# Patient Record
Sex: FEMALE | Race: WHITE | Employment: OTHER | URBAN - METROPOLITAN AREA
[De-identification: names, ages, dates, MRNs, and addresses within clinical notes are randomized per-mention and may not be internally consistent; named-entity substitution may affect disease eponyms.]

---

## 2017-06-02 ENCOUNTER — HOSPITAL ENCOUNTER (OUTPATIENT)
Dept: MAMMOGRAPHY | Age: 62
Discharge: HOME | End: 2017-06-02
Attending: FAMILY MEDICINE | Admitting: FAMILY MEDICINE

## 2017-06-02 DIAGNOSIS — Z12.31 ENCOUNTER FOR SCREENING MAMMOGRAM FOR BREAST CANCER: ICD-10-CM

## 2017-06-15 ENCOUNTER — OFFICE VISIT (OUTPATIENT)
Dept: INTERNAL MEDICINE CLINIC | Age: 62
End: 2017-06-15

## 2017-06-15 VITALS
HEART RATE: 60 BPM | DIASTOLIC BLOOD PRESSURE: 80 MMHG | BODY MASS INDEX: 19.6 KG/M2 | WEIGHT: 110.6 LBS | SYSTOLIC BLOOD PRESSURE: 108 MMHG | HEIGHT: 63 IN

## 2017-06-15 DIAGNOSIS — J30.2 SEASONAL ALLERGIC RHINITIS, UNSPECIFIED ALLERGIC RHINITIS TRIGGER: ICD-10-CM

## 2017-06-15 DIAGNOSIS — Z00.00 ROUTINE PHYSICAL EXAMINATION: Primary | ICD-10-CM

## 2017-06-15 DIAGNOSIS — Z11.59 NEED FOR HEPATITIS C SCREENING TEST: ICD-10-CM

## 2017-06-15 DIAGNOSIS — J45.20 ASTHMA, EXTRINSIC, MILD INTERMITTENT, UNCOMPLICATED: ICD-10-CM

## 2017-06-15 DIAGNOSIS — R23.2 HOT FLASHES: ICD-10-CM

## 2017-06-15 PROCEDURE — 99396 PREV VISIT EST AGE 40-64: CPT | Performed by: FAMILY MEDICINE

## 2017-06-15 RX ORDER — ALBUTEROL SULFATE 90 UG/1
2 AEROSOL, METERED RESPIRATORY (INHALATION) EVERY 4 HOURS PRN
Qty: 3 INHALER | Refills: 1 | Status: SHIPPED | OUTPATIENT
Start: 2017-06-15 | End: 2022-08-30 | Stop reason: SDUPTHER

## 2017-06-15 RX ORDER — ESTRADIOL AND NORETHINDRONE ACETATE 1; .5 MG/1; MG/1
TABLET ORAL
Qty: 84 TABLET | Refills: 3 | Status: SHIPPED | OUTPATIENT
Start: 2017-06-15 | End: 2018-05-18 | Stop reason: SDUPTHER

## 2017-06-15 RX ORDER — FLUTICASONE PROPIONATE 50 MCG
SPRAY, SUSPENSION (ML) NASAL
Qty: 3 BOTTLE | Refills: 3 | Status: SHIPPED | OUTPATIENT
Start: 2017-06-15 | End: 2019-05-15 | Stop reason: SDUPTHER

## 2017-06-15 ASSESSMENT — PATIENT HEALTH QUESTIONNAIRE - PHQ9
1. LITTLE INTEREST OR PLEASURE IN DOING THINGS: 0
SUM OF ALL RESPONSES TO PHQ QUESTIONS 1-9: 0
2. FEELING DOWN, DEPRESSED OR HOPELESS: 0
SUM OF ALL RESPONSES TO PHQ9 QUESTIONS 1 & 2: 0

## 2017-06-21 DIAGNOSIS — R23.2 HOT FLASHES: ICD-10-CM

## 2017-06-21 DIAGNOSIS — Z11.59 NEED FOR HEPATITIS C SCREENING TEST: ICD-10-CM

## 2017-06-21 DIAGNOSIS — Z00.00 ROUTINE PHYSICAL EXAMINATION: ICD-10-CM

## 2017-06-21 LAB
A/G RATIO: 1.8 (ref 1.1–2.2)
ALBUMIN SERPL-MCNC: 4.2 G/DL (ref 3.4–5)
ALP BLD-CCNC: 57 U/L (ref 40–129)
ALT SERPL-CCNC: 18 U/L (ref 10–40)
ANION GAP SERPL CALCULATED.3IONS-SCNC: 14 MMOL/L (ref 3–16)
AST SERPL-CCNC: 29 U/L (ref 15–37)
BASOPHILS ABSOLUTE: 0.1 K/UL (ref 0–0.2)
BASOPHILS RELATIVE PERCENT: 1.9 %
BILIRUB SERPL-MCNC: 0.5 MG/DL (ref 0–1)
BUN BLDV-MCNC: 15 MG/DL (ref 7–20)
CALCIUM SERPL-MCNC: 9.3 MG/DL (ref 8.3–10.6)
CHLORIDE BLD-SCNC: 103 MMOL/L (ref 99–110)
CHOLESTEROL, TOTAL: 222 MG/DL (ref 0–199)
CO2: 25 MMOL/L (ref 21–32)
CREAT SERPL-MCNC: 0.8 MG/DL (ref 0.6–1.2)
EOSINOPHILS ABSOLUTE: 0.2 K/UL (ref 0–0.6)
EOSINOPHILS RELATIVE PERCENT: 5.1 %
GFR AFRICAN AMERICAN: >60
GFR NON-AFRICAN AMERICAN: >60
GLOBULIN: 2.3 G/DL
GLUCOSE BLD-MCNC: 83 MG/DL (ref 70–99)
HCT VFR BLD CALC: 44.2 % (ref 36–48)
HDLC SERPL-MCNC: 86 MG/DL (ref 40–60)
HEMOGLOBIN: 14.4 G/DL (ref 12–16)
HEPATITIS C ANTIBODY INTERPRETATION: NORMAL
LDL CHOLESTEROL CALCULATED: 120 MG/DL
LYMPHOCYTES ABSOLUTE: 1.3 K/UL (ref 1–5.1)
LYMPHOCYTES RELATIVE PERCENT: 34.2 %
MCH RBC QN AUTO: 28.8 PG (ref 26–34)
MCHC RBC AUTO-ENTMCNC: 32.6 G/DL (ref 31–36)
MCV RBC AUTO: 88.4 FL (ref 80–100)
MONOCYTES ABSOLUTE: 0.4 K/UL (ref 0–1.3)
MONOCYTES RELATIVE PERCENT: 9.6 %
NEUTROPHILS ABSOLUTE: 1.8 K/UL (ref 1.7–7.7)
NEUTROPHILS RELATIVE PERCENT: 49.2 %
PDW BLD-RTO: 16.1 % (ref 12.4–15.4)
PLATELET # BLD: 207 K/UL (ref 135–450)
PMV BLD AUTO: 9.1 FL (ref 5–10.5)
POTASSIUM SERPL-SCNC: 4 MMOL/L (ref 3.5–5.1)
RBC # BLD: 5 M/UL (ref 4–5.2)
SODIUM BLD-SCNC: 142 MMOL/L (ref 136–145)
TOTAL PROTEIN: 6.5 G/DL (ref 6.4–8.2)
TRIGL SERPL-MCNC: 81 MG/DL (ref 0–150)
TSH REFLEX: 2.26 UIU/ML (ref 0.27–4.2)
VITAMIN D 25-HYDROXY: 55.7 NG/ML
VLDLC SERPL CALC-MCNC: 16 MG/DL
WBC # BLD: 3.7 K/UL (ref 4–11)

## 2018-05-21 ENCOUNTER — TELEPHONE (OUTPATIENT)
Dept: INTERNAL MEDICINE CLINIC | Age: 63
End: 2018-05-21

## 2018-07-02 ENCOUNTER — OFFICE VISIT (OUTPATIENT)
Dept: INTERNAL MEDICINE CLINIC | Age: 63
End: 2018-07-02

## 2018-07-02 VITALS
DIASTOLIC BLOOD PRESSURE: 80 MMHG | BODY MASS INDEX: 19.49 KG/M2 | WEIGHT: 110 LBS | HEIGHT: 63 IN | SYSTOLIC BLOOD PRESSURE: 100 MMHG | HEART RATE: 60 BPM

## 2018-07-02 DIAGNOSIS — Z12.31 SCREENING MAMMOGRAM, ENCOUNTER FOR: ICD-10-CM

## 2018-07-02 DIAGNOSIS — Z23 NEED FOR ZOSTER VACCINATION: ICD-10-CM

## 2018-07-02 DIAGNOSIS — Z00.00 ROUTINE PHYSICAL EXAMINATION: Primary | ICD-10-CM

## 2018-07-02 DIAGNOSIS — N95.1 MENOPAUSAL SYMPTOM: ICD-10-CM

## 2018-07-02 PROCEDURE — 99396 PREV VISIT EST AGE 40-64: CPT | Performed by: FAMILY MEDICINE

## 2018-07-02 RX ORDER — ESTRADIOL AND NORETHINDRONE ACETATE 1; .5 MG/1; MG/1
TABLET ORAL
Qty: 84 TABLET | Refills: 3 | Status: SHIPPED | OUTPATIENT
Start: 2018-07-02 | End: 2019-08-30 | Stop reason: SDUPTHER

## 2018-07-02 ASSESSMENT — PATIENT HEALTH QUESTIONNAIRE - PHQ9
SUM OF ALL RESPONSES TO PHQ QUESTIONS 1-9: 0
SUM OF ALL RESPONSES TO PHQ9 QUESTIONS 1 & 2: 0
1. LITTLE INTEREST OR PLEASURE IN DOING THINGS: 0
2. FEELING DOWN, DEPRESSED OR HOPELESS: 0

## 2018-07-02 NOTE — PATIENT INSTRUCTIONS
SHINGLES VACCINE = SHINGRIX  1 out of 3 people will get shingles in their lifetime. If you had shingles already once in your life you have a 5% chance of getting it a second time = 5/100 chance of getting it again    The new Shingles vaccine = Shingrix is 95% effective at preventing shingles. The old vaccine Zostavax was 50% effective. The vaccine may not be covered by your insurance, but is on Medicare Part D = will be a prescription benefit and co-pays can vary widely;  out of pocket price without insurance is around $150 per dose and requires 2 doses. Show them your insurance and prescription card; The pharmacy can tell you if it is covered, and how much money you will be responsible for. It is up to you to decide if you want this vaccine.       xxxxxxxxxxxxxxxxxxxxxxxxxxxxxxxxxxxxxxx

## 2018-07-02 NOTE — PROGRESS NOTES
Subjective:      Patient ID: Florecita Lake is a 60 y.o. female. HPI   Chief Complaint   Patient presents with    Medication Refill       Annual preventative exam and med refill    Her oldest getting  Mem[facility] Day weekend    She is down to 1/2 pill per day of the [address]. Mother's cancer did come back. She is on a new drug for her genetic cancer. They live in [facility].  lost his mother and his brother and under a lot of stress. Some stress so thinks some of the night sweats and hot flashes are stress and not menopause. Patient Active Problem List   Diagnosis    Allergic rhinitis    Asthma, extrinsic    Tubular adenoma of colon         Outpatient Prescriptions Marked as Taking for the 7/2/18 encounter (Office Visit) with Feliz Canela MD   Medication Sig Dispense Refill    estradiol-norethindrone (ACTIVELLA) 1-0.5 MG per tablet TAKE 1 TABLET DAILY 84 tablet 0    fluticasone (FLONASE) 50 MCG/ACT nasal spray USE 2 SPRAYS NASALLY DAILY 3 Bottle 3    Fexofenadine HCl (ALLERGY 24-HR PO) Take by mouth      GuaiFENesin (MUCINEX PO) Take by mouth         Social History   Substance Use Topics    Smoking status: Former Smoker     Packs/day: 0.30     Years: 4.00     Types: Cigarettes     Quit date: 1/1/1988    Smokeless tobacco: Never Used    Alcohol use 0.0 - 0.5 oz/week       No Known Allergies    Past Medical History:   Diagnosis Date    Allergic rhinitis     Asthma, extrinsic     mild intermittent    Foot fracture 2009    Menopausal or female climacteric states 7/08    Tubular adenoma of colon 2011    [facility] at [facility]       Past Surgical History:   Procedure Laterality Date    [address]       Review of Systems   Constitutional: Positive for diaphoresis (and hot flashes). All other systems reviewed and are negative. Objective:   Physical Exam   Constitutional: She is oriented to person, place, and time.  She appears

## 2018-07-12 ENCOUNTER — HOSPITAL ENCOUNTER (OUTPATIENT)
Dept: MAMMOGRAPHY | Age: 63
Discharge: HOME | End: 2018-07-12
Attending: FAMILY MEDICINE | Admitting: FAMILY MEDICINE

## 2018-07-12 DIAGNOSIS — Z12.31 SCREENING MAMMOGRAM, ENCOUNTER FOR: ICD-10-CM

## 2019-07-25 ENCOUNTER — HOSPITAL ENCOUNTER (OUTPATIENT)
Dept: WOMENS IMAGING | Age: 64
Discharge: HOME | End: 2019-07-25
Payer: COMMERCIAL

## 2019-07-25 DIAGNOSIS — Z12.31 ENCOUNTER FOR SCREENING MAMMOGRAM FOR BREAST CANCER: ICD-10-CM

## 2019-07-25 PROCEDURE — 77063 BREAST TOMOSYNTHESIS BI: CPT

## 2019-08-30 ENCOUNTER — OFFICE VISIT (OUTPATIENT)
Dept: INTERNAL MEDICINE CLINIC | Age: 64
End: 2019-08-30
Payer: COMMERCIAL

## 2019-08-30 VITALS
DIASTOLIC BLOOD PRESSURE: 72 MMHG | HEIGHT: 63 IN | WEIGHT: 113 LBS | SYSTOLIC BLOOD PRESSURE: 122 MMHG | BODY MASS INDEX: 20.02 KG/M2 | HEART RATE: 60 BPM

## 2019-08-30 DIAGNOSIS — R23.2 HOT FLASHES: ICD-10-CM

## 2019-08-30 DIAGNOSIS — Z12.4 SCREENING FOR CERVICAL CANCER: ICD-10-CM

## 2019-08-30 DIAGNOSIS — N95.1 MENOPAUSAL SYMPTOM: ICD-10-CM

## 2019-08-30 DIAGNOSIS — Z01.419 WOMEN'S ANNUAL ROUTINE GYNECOLOGICAL EXAMINATION: Primary | ICD-10-CM

## 2019-08-30 DIAGNOSIS — Z00.00 PREVENTATIVE HEALTH CARE: ICD-10-CM

## 2019-08-30 DIAGNOSIS — Z78.0 POSTMENOPAUSAL: ICD-10-CM

## 2019-08-30 DIAGNOSIS — Z23 NEED FOR TDAP VACCINATION: ICD-10-CM

## 2019-08-30 PROCEDURE — 90471 IMMUNIZATION ADMIN: CPT | Performed by: FAMILY MEDICINE

## 2019-08-30 PROCEDURE — 90715 TDAP VACCINE 7 YRS/> IM: CPT | Performed by: FAMILY MEDICINE

## 2019-08-30 PROCEDURE — 99396 PREV VISIT EST AGE 40-64: CPT | Performed by: FAMILY MEDICINE

## 2019-08-30 RX ORDER — ESTRADIOL AND NORETHINDRONE ACETATE 1; .5 MG/1; MG/1
TABLET ORAL
Qty: 28 TABLET | Refills: 1 | Status: SHIPPED | OUTPATIENT
Start: 2019-08-30 | End: 2020-11-19 | Stop reason: ALTCHOICE

## 2019-08-30 ASSESSMENT — PATIENT HEALTH QUESTIONNAIRE - PHQ9
SUM OF ALL RESPONSES TO PHQ QUESTIONS 1-9: 0
1. LITTLE INTEREST OR PLEASURE IN DOING THINGS: 0
2. FEELING DOWN, DEPRESSED OR HOPELESS: 0
SUM OF ALL RESPONSES TO PHQ9 QUESTIONS 1 & 2: 0
SUM OF ALL RESPONSES TO PHQ QUESTIONS 1-9: 0

## 2019-08-30 NOTE — PROGRESS NOTES
Chief Complaint   Patient presents with    Gynecologic Exam         ROUTINE GYNECOLOGIC EXAM and PREVENTATIVE EXAM     Gynecologic History  No LMP recorded. Patient is postmenopausal.  Contraception: (if applicable) none  Last Pap:     Sexually active:  Yes      Exercise:  Runs 6 days per week. 30-60 minutes. Very regimented. Patient Active Problem List   Diagnosis    Allergic rhinitis    Asthma, extrinsic    Tubular adenoma of colon         Past Medical History:   Diagnosis Date    Allergic rhinitis     Asthma, extrinsic     mild intermittent    Foot fracture     Menopausal or female climacteric states     Tubular adenoma of colon     [facility] at [facility]     Past Surgical History:   Procedure Laterality Date     SECTION      TUBAL LIGATION       Family History   Problem Relation Age of Onset    Cancer Mother         MGUS vs Mulitple myeloma    Lung Cancer Mother 80        metastatic     Coronary Art Dis Father         Age 74s    Elevated Lipids Father     Other Father         thoracic aortic aneurysm    Prostate Cancer Maternal Grandfather     Lung Cancer Paternal Grandfather          Social History     Tobacco Use    Smoking status: Former Smoker     Packs/day: 0.30     Years: 4.00     Pack years: 1.20     Types: Cigarettes     Last attempt to quit: 1988     Years since quittin.6    Smokeless tobacco: Never Used   Substance Use Topics    Alcohol use: Yes     Alcohol/week: 0.0 - 0.8 standard drinks    Drug use:  No             Outpatient Medications Marked as Taking for the 19 encounter (Office Visit) with Emanuel Martines MD   Medication Sig Dispense Refill    fluticasone (FLONASE) 50 MCG/ACT nasal spray USE 2 SPRAYS NASALLY DAILY 3 Bottle 3    estradiol-norethindrone (ACTIVELLA) 1-0.5 MG per tablet  (Patient taking differently: Take 0.5 tablets by mouth daily --- actually twice a week) 84 tablet 3    Fexofenadine HCl (ALLERGY 24-HR PO) signature was used to authenticate this note.     --Ai Robertson MD on 3:42 PM , 8/30/2019        '

## 2019-09-04 LAB
HPV COMMENT: NORMAL
HPV TYPE 16: NOT DETECTED
HPV TYPE 18: NOT DETECTED
HPVOH (OTHER TYPES): NOT DETECTED

## 2019-09-14 ENCOUNTER — HOSPITAL ENCOUNTER (OUTPATIENT)
Age: 64
Discharge: HOME | End: 2019-09-14
Payer: COMMERCIAL

## 2019-09-14 DIAGNOSIS — R23.2 HOT FLASHES: ICD-10-CM

## 2019-09-14 DIAGNOSIS — Z00.00 PREVENTATIVE HEALTH CARE: ICD-10-CM

## 2019-09-14 LAB
A/G RATIO: 1.9 (ref 1.1–2.2)
ALBUMIN SERPL-MCNC: 4.2 G/DL (ref 3.4–5)
ALP BLD-CCNC: 96 U/L (ref 40–129)
ALT SERPL-CCNC: 38 U/L (ref 10–40)
ANION GAP SERPL CALCULATED.3IONS-SCNC: 13 MMOL/L (ref 3–16)
AST SERPL-CCNC: 44 U/L (ref 15–37)
BASOPHILS ABSOLUTE: 0.1 K/UL (ref 0–0.2)
BASOPHILS RELATIVE PERCENT: 2.7 %
BILIRUB SERPL-MCNC: 0.4 MG/DL (ref 0–1)
BUN BLDV-MCNC: 12 MG/DL (ref 7–20)
CALCIUM SERPL-MCNC: 9.2 MG/DL (ref 8.3–10.6)
CHLORIDE BLD-SCNC: 104 MMOL/L (ref 99–110)
CHOLESTEROL, TOTAL: 198 MG/DL (ref 0–199)
CO2: 24 MMOL/L (ref 21–32)
CREAT SERPL-MCNC: 0.6 MG/DL (ref 0.6–1.2)
EOSINOPHILS ABSOLUTE: 0.1 K/UL (ref 0–0.6)
EOSINOPHILS RELATIVE PERCENT: 3.5 %
GFR AFRICAN AMERICAN: >60
GFR NON-AFRICAN AMERICAN: >60
GLOBULIN: 2.2 G/DL
GLUCOSE BLD-MCNC: 93 MG/DL (ref 70–99)
HCT VFR BLD CALC: 42.6 % (ref 36–48)
HDLC SERPL-MCNC: 79 MG/DL (ref 40–60)
HEMOGLOBIN: 14.5 G/DL (ref 12–16)
LDL CHOLESTEROL CALCULATED: 102 MG/DL
LYMPHOCYTES ABSOLUTE: 1.1 K/UL (ref 1–5.1)
LYMPHOCYTES RELATIVE PERCENT: 35.6 %
MCH RBC QN AUTO: 29.8 PG (ref 26–34)
MCHC RBC AUTO-ENTMCNC: 34 G/DL (ref 31–36)
MCV RBC AUTO: 87.8 FL (ref 80–100)
MONOCYTES ABSOLUTE: 0.4 K/UL (ref 0–1.3)
MONOCYTES RELATIVE PERCENT: 12.3 %
NEUTROPHILS ABSOLUTE: 1.4 K/UL (ref 1.7–7.7)
NEUTROPHILS RELATIVE PERCENT: 45.9 %
PDW BLD-RTO: 14.4 % (ref 12.4–15.4)
PLATELET # BLD: 210 K/UL (ref 135–450)
PMV BLD AUTO: 9.2 FL (ref 5–10.5)
POTASSIUM SERPL-SCNC: 4.3 MMOL/L (ref 3.5–5.1)
RBC # BLD: 4.85 M/UL (ref 4–5.2)
SODIUM BLD-SCNC: 141 MMOL/L (ref 136–145)
TOTAL PROTEIN: 6.4 G/DL (ref 6.4–8.2)
TRIGL SERPL-MCNC: 84 MG/DL (ref 0–150)
TSH REFLEX: 2.45 UIU/ML (ref 0.27–4.2)
VLDLC SERPL CALC-MCNC: 17 MG/DL
WBC # BLD: 3 K/UL (ref 4–11)

## 2019-09-14 PROCEDURE — 36415 COLL VENOUS BLD VENIPUNCTURE: CPT

## 2019-09-14 PROCEDURE — 80061 LIPID PANEL: CPT

## 2019-09-14 PROCEDURE — 80053 COMPREHEN METABOLIC PANEL: CPT

## 2019-09-14 PROCEDURE — 85025 COMPLETE CBC W/AUTO DIFF WBC: CPT

## 2019-09-14 PROCEDURE — 84443 ASSAY THYROID STIM HORMONE: CPT

## 2019-09-15 DIAGNOSIS — D70.9 NEUTROPENIA, UNSPECIFIED TYPE (HCC): Primary | ICD-10-CM

## 2019-11-01 ENCOUNTER — E-VISIT (OUTPATIENT)
Dept: INTERNAL MEDICINE CLINIC | Age: 64
End: 2019-11-01
Payer: COMMERCIAL

## 2019-11-01 PROCEDURE — 99444 PR PHYSICIAN ONLINE EVALUATION & MANAGEMENT SERVICE: CPT | Performed by: FAMILY MEDICINE

## 2019-11-01 RX ORDER — PSEUDOEPHEDRINE HCL 60 MG/1
60 TABLET ORAL EVERY 6 HOURS PRN
Qty: 30 TABLET | Refills: 0 | Status: SHIPPED | OUTPATIENT
Start: 2019-11-01 | End: 2019-11-11

## 2019-11-01 RX ORDER — AMOXICILLIN AND CLAVULANATE POTASSIUM 875; 125 MG/1; MG/1
TABLET, FILM COATED ORAL
Qty: 20 TABLET | Refills: 0 | Status: SHIPPED | OUTPATIENT
Start: 2019-11-01 | End: 2019-11-11

## 2019-11-01 ASSESSMENT — LIFESTYLE VARIABLES
SMOKING_STATUS: NO, I'M A FORMER SMOKER
SMOKING_YEARS: 6
PACKS_PER_DAY: 0

## 2019-12-14 ENCOUNTER — PATIENT MESSAGE (OUTPATIENT)
Dept: INTERNAL MEDICINE CLINIC | Age: 64
End: 2019-12-14

## 2019-12-15 RX ORDER — PREDNISONE 20 MG/1
TABLET ORAL
Qty: 10 TABLET | Refills: 0 | Status: SHIPPED | OUTPATIENT
Start: 2019-12-18 | End: 2019-12-22

## 2019-12-15 RX ORDER — CEFDINIR 300 MG/1
300 CAPSULE ORAL 2 TIMES DAILY
Qty: 20 CAPSULE | Refills: 0 | Status: SHIPPED | OUTPATIENT
Start: 2019-12-15 | End: 2019-12-25

## 2020-04-10 ENCOUNTER — E-VISIT (OUTPATIENT)
Dept: INTERNAL MEDICINE CLINIC | Age: 65
End: 2020-04-10
Payer: MEDICARE

## 2020-04-10 PROBLEM — H00.021 HORDEOLUM INTERNUM OF RIGHT UPPER EYELID: Status: ACTIVE | Noted: 2020-04-10

## 2020-04-10 PROCEDURE — 99421 OL DIG E/M SVC 5-10 MIN: CPT | Performed by: FAMILY MEDICINE

## 2020-04-10 RX ORDER — POLYMYXIN B SULFATE AND TRIMETHOPRIM 1; 10000 MG/ML; [USP'U]/ML
1 SOLUTION OPHTHALMIC
Qty: 1 BOTTLE | Refills: 0 | Status: SHIPPED | OUTPATIENT
Start: 2020-04-10 | End: 2022-02-24 | Stop reason: SDUPTHER

## 2020-04-10 NOTE — PROGRESS NOTES
share towels, pillows, or washcloths while you have a stye. 5-10 minutes were spent on digital evaluation and management.

## 2020-08-17 ENCOUNTER — OFFICE VISIT (OUTPATIENT)
Dept: PRIMARY CARE CLINIC | Age: 65
End: 2020-08-17
Payer: MEDICARE

## 2020-08-17 PROCEDURE — 99211 OFF/OP EST MAY X REQ PHY/QHP: CPT | Performed by: NURSE PRACTITIONER

## 2020-08-18 LAB — SARS-COV-2, NAA: NOT DETECTED

## 2020-09-01 ENCOUNTER — HOSPITAL ENCOUNTER (OUTPATIENT)
Dept: WOMENS IMAGING | Age: 65
Discharge: HOME | End: 2020-09-01
Payer: MEDICARE

## 2020-09-01 PROCEDURE — 77063 BREAST TOMOSYNTHESIS BI: CPT

## 2020-11-19 ENCOUNTER — OFFICE VISIT (OUTPATIENT)
Dept: INTERNAL MEDICINE CLINIC | Age: 65
End: 2020-11-19
Payer: MEDICARE

## 2020-11-19 VITALS
TEMPERATURE: 97.6 F | DIASTOLIC BLOOD PRESSURE: 70 MMHG | HEART RATE: 60 BPM | BODY MASS INDEX: 19.84 KG/M2 | WEIGHT: 112 LBS | HEIGHT: 63 IN | SYSTOLIC BLOOD PRESSURE: 110 MMHG

## 2020-11-19 PROBLEM — H00.021 HORDEOLUM INTERNUM OF RIGHT UPPER EYELID: Status: RESOLVED | Noted: 2020-04-10 | Resolved: 2020-11-19

## 2020-11-19 LAB
A/G RATIO: 2 (ref 1.1–2.2)
ALBUMIN SERPL-MCNC: 4.3 G/DL (ref 3.4–5)
ALP BLD-CCNC: 100 U/L (ref 40–129)
ALT SERPL-CCNC: 26 U/L (ref 10–40)
ANION GAP SERPL CALCULATED.3IONS-SCNC: 10 MMOL/L (ref 3–16)
AST SERPL-CCNC: 36 U/L (ref 15–37)
BILIRUB SERPL-MCNC: 0.4 MG/DL (ref 0–1)
BUN BLDV-MCNC: 21 MG/DL (ref 7–20)
C-REACTIVE PROTEIN: <0.3 MG/L (ref 0–5.1)
CALCIUM SERPL-MCNC: 9.5 MG/DL (ref 8.3–10.6)
CHLORIDE BLD-SCNC: 100 MMOL/L (ref 99–110)
CO2: 28 MMOL/L (ref 21–32)
CREAT SERPL-MCNC: 0.6 MG/DL (ref 0.6–1.2)
GFR AFRICAN AMERICAN: >60
GFR NON-AFRICAN AMERICAN: >60
GLOBULIN: 2.1 G/DL
GLUCOSE BLD-MCNC: 104 MG/DL (ref 70–99)
HCT VFR BLD CALC: 43 % (ref 36–48)
HEMOGLOBIN: 14.3 G/DL (ref 12–16)
MCH RBC QN AUTO: 29.1 PG (ref 26–34)
MCHC RBC AUTO-ENTMCNC: 33.3 G/DL (ref 31–36)
MCV RBC AUTO: 87.4 FL (ref 80–100)
PDW BLD-RTO: 14.4 % (ref 12.4–15.4)
PLATELET # BLD: 240 K/UL (ref 135–450)
PMV BLD AUTO: 9.2 FL (ref 5–10.5)
POTASSIUM SERPL-SCNC: 4.3 MMOL/L (ref 3.5–5.1)
RBC # BLD: 4.92 M/UL (ref 4–5.2)
RHEUMATOID FACTOR: <10 IU/ML
SODIUM BLD-SCNC: 138 MMOL/L (ref 136–145)
TOTAL PROTEIN: 6.4 G/DL (ref 6.4–8.2)
TSH REFLEX: 1.93 UIU/ML (ref 0.27–4.2)
WBC # BLD: 3.6 K/UL (ref 4–11)

## 2020-11-19 PROCEDURE — 99214 OFFICE O/P EST MOD 30 MIN: CPT | Performed by: FAMILY MEDICINE

## 2020-11-19 PROCEDURE — 90732 PPSV23 VACC 2 YRS+ SUBQ/IM: CPT | Performed by: FAMILY MEDICINE

## 2020-11-19 PROCEDURE — G0009 ADMIN PNEUMOCOCCAL VACCINE: HCPCS | Performed by: FAMILY MEDICINE

## 2020-11-19 SDOH — ECONOMIC STABILITY: TRANSPORTATION INSECURITY
IN THE PAST 12 MONTHS, HAS THE LACK OF TRANSPORTATION KEPT YOU FROM MEDICAL APPOINTMENTS OR FROM GETTING MEDICATIONS?: NO

## 2020-11-19 SDOH — ECONOMIC STABILITY: FOOD INSECURITY: WITHIN THE PAST 12 MONTHS, YOU WORRIED THAT YOUR FOOD WOULD RUN OUT BEFORE YOU GOT MONEY TO BUY MORE.: NEVER TRUE

## 2020-11-19 SDOH — ECONOMIC STABILITY: TRANSPORTATION INSECURITY
IN THE PAST 12 MONTHS, HAS LACK OF TRANSPORTATION KEPT YOU FROM MEETINGS, WORK, OR FROM GETTING THINGS NEEDED FOR DAILY LIVING?: NO

## 2020-11-19 SDOH — ECONOMIC STABILITY: FOOD INSECURITY: WITHIN THE PAST 12 MONTHS, THE FOOD YOU BOUGHT JUST DIDN'T LAST AND YOU DIDN'T HAVE MONEY TO GET MORE.: NEVER TRUE

## 2020-11-19 SDOH — ECONOMIC STABILITY: INCOME INSECURITY: HOW HARD IS IT FOR YOU TO PAY FOR THE VERY BASICS LIKE FOOD, HOUSING, MEDICAL CARE, AND HEATING?: NOT HARD AT ALL

## 2020-11-19 ASSESSMENT — PATIENT HEALTH QUESTIONNAIRE - PHQ9
SUM OF ALL RESPONSES TO PHQ QUESTIONS 1-9: 2
SUM OF ALL RESPONSES TO PHQ9 QUESTIONS 1 & 2: 2
2. FEELING DOWN, DEPRESSED OR HOPELESS: 1
SUM OF ALL RESPONSES TO PHQ QUESTIONS 1-9: 2
1. LITTLE INTEREST OR PLEASURE IN DOING THINGS: 1
SUM OF ALL RESPONSES TO PHQ QUESTIONS 1-9: 2

## 2020-11-19 NOTE — PROGRESS NOTES
Subjective:      Patient ID: Nayeli Hidalgo is a 71 y.o. female. HPI   Chief Complaint   Patient presents with    Check-Up     Allergies. Knuckles are swelling and red. Painful       Has not been in for a year so wants to go over her medications and issues. Allergies are really bad. Using otc nasal spray. She always has mucous. On Flonase right now and helps some symptoms but not others. Red and inflamed DIPs and not MCPs. Some of the PIPs are swollen but not red. Painful with cold weather and this time of year. Mother has the same sort of arthritis but not diagnosed as RA. Asthma has been doing fine. Not symptomatic at this time. Does admit to some fatigue but thinks it is weather and pandemic related. 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 Will probably retire for good soon. No Known Allergies      See Assessment for more detail on conditions addressed today. Patient Active Problem List   Diagnosis    Allergic rhinitis    Asthma, extrinsic    Tubular adenoma of colon    Hordeolum internum of right upper eyelid         Outpatient Medications Marked as Taking for the 20 encounter (Office Visit) with [address], MD   Medication Sig Dispense Refill    fluticasone (FLONASE) 50 MCG/ACT nasal spray USE 2 SPRAYS NASALLY DAILY 3 Bottle 3    Fexofenadine HCl (ALLERGY 24-HR PO) Take by mouth      GuaiFENesin (MUCINEX PO) Take by mouth         Social History     Tobacco Use    Smoking status: Former Smoker     Packs/day: 0.30     Years: 4.00     Pack years: 1.20     Types: Cigarettes     Last attempt to quit: 1988     Years since quittin.9    Smokeless tobacco: Never Used   Substance Use Topics    Alcohol use: Yes     Alcohol/week: 0.0 - 0.8 standard drinks    Drug use: No         Review of Systems   HENT: Negative for sore throat. Respiratory: Negative for cough, chest tightness, shortness of breath and wheezing. Cardiovascular: Negative for chest pain, palpitations and leg swelling. Skin: Negative for rash and wound. Neurological: Negative for dizziness, syncope, facial asymmetry, speech difficulty, weakness, numbness and headaches. Objective:   Physical Exam  Vitals signs reviewed. Constitutional:       General: She is not in acute distress. Appearance: Normal appearance. She is well-developed. She is not diaphoretic. HENT:      Head: Normocephalic and atraumatic. Right Ear: Hearing, tympanic membrane, ear canal and external ear normal.      Left Ear: Hearing, tympanic membrane, ear canal and external ear normal.      Nose: Nose normal. No mucosal edema, congestion or rhinorrhea. Comments: Red irritated nasal mucosa  Eyes:      General: No scleral icterus. Conjunctiva/sclera: Conjunctivae normal.      Pupils: Pupils are equal, round, and reactive to light. Neck:      Musculoskeletal: Normal range of motion and neck supple. Thyroid: No thyroid mass or thyromegaly. Vascular: No carotid bruit. Cardiovascular:      Rate and Rhythm: Normal rate and regular rhythm. Heart sounds: Normal heart sounds, S1 normal and S2 normal. No murmur. Pulmonary:      Effort: Pulmonary effort is normal. No respiratory distress. Breath sounds: Normal breath sounds. No decreased breath sounds, wheezing, rhonchi or rales. Abdominal:      General: Bowel sounds are normal. There is no abdominal bruit. Palpations: Abdomen is soft. There is no hepatomegaly or mass. Tenderness: There is no abdominal tenderness. Musculoskeletal: Normal range of motion. General: No tenderness. Lymphadenopathy:      Cervical: No cervical adenopathy. Skin:     General: Skin is warm and dry. Coloration: Skin is not pale. Nails: There is no clubbing. Neurological:      Mental Status: She is alert and oriented to person, place, and time.       Cranial Nerves: No cranial nerve deficit. Motor: No tremor or abnormal muscle tone. Coordination: Coordination normal.      Gait: Gait normal.      Deep Tendon Reflexes: Reflexes are normal and symmetric. Psychiatric:         Speech: Speech normal.         Behavior: Behavior normal.       .  Wt Readings from Last 3 Encounters:   11/19/20 112 lb (50.8 kg)   08/30/19 113 lb (51.3 kg)   07/02/18 110 lb (49.9 kg)       The 10-year ASCVD risk score (Lupe Wheeler, et al., 2013) is: 3.6%    Values used to calculate the score:      Age: 71 years      Sex: Female      Is Non- : No      Diabetic: No      Tobacco smoker: No      Systolic Blood Pressure: 459 mmHg      Is BP treated: No      HDL Cholesterol: 79 mg/dL      Total Cholesterol: 198 mg/dL      Assessment:      1. Mild intermittent extrinsic asthma without complication  No need for inhaler in over a year. Inactive. 2. Seasonal allergic rhinitis, unspecified trigger  Change to less irritation nasal steroid. Prn Zyrtec and prn Sudafed. Keep nasal membranes moisturized. - budesonide (RHINOCORT AQUA) 32 MCG/ACT nasal spray; 1 spray by Each Nostril route 2 times daily  Dispense: 120 Bottle; Refill: 11    3. Postmenopausal  - DEXA BONE DENSITY AXIAL SKELETON; Future    4. 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 Consider NSAID daily. - Comprehensive Metabolic Panel  - Cyclic Citrul Peptide Antibody, IgG  - C-Reactive Protein  - Sedimentation Rate  - Rheumatoid Factor  - MICHELLE Reflex to Antibody Cascade    5. Fatigue, unspecified type  - Comprehensive Metabolic Panel  - CBC  - TSH with Reflex    6. Need for pneumococcal vaccination  - PNEUMOVAX 23 subcutaneous/IM (Pneumococcal polysaccharide vaccine 23-valent >= 1yo)            Plan:      See orders. See after visit summary, patient instructions, and reference hand-outs.   A PRINTED SUMMARY = AVS GIVEN TO THE PATIENT, (or if Virtual Visit, patient told it is available in My Chart or will be mailed if not active on My Chart.)    Discussed use, benefit, and side effects of prescribed medications. Barriers to medication compliance addressed. All patient questions answered.           Tara Heller MD

## 2020-11-20 LAB
ANTI-NUCLEAR ANTIBODY (ANA): NEGATIVE
CYCLIC CITRULLINATED PEPTIDE ANTIBODY IGG: 0.7 U/ML (ref 0–2.9)
SEDIMENTATION RATE, ERYTHROCYTE: 7 MM/HR (ref 0–30)

## 2020-11-22 ASSESSMENT — ENCOUNTER SYMPTOMS
SORE THROAT: 0
COUGH: 0
SHORTNESS OF BREATH: 0
WHEEZING: 0
CHEST TIGHTNESS: 0

## 2021-02-09 ENCOUNTER — VIRTUAL VISIT (OUTPATIENT)
Dept: INTERNAL MEDICINE CLINIC | Age: 66
End: 2021-02-09
Payer: MEDICARE

## 2021-02-09 DIAGNOSIS — Z00.00 ROUTINE GENERAL MEDICAL EXAMINATION AT A HEALTH CARE FACILITY: Primary | ICD-10-CM

## 2021-02-09 DIAGNOSIS — Z12.11 COLON CANCER SCREENING: ICD-10-CM

## 2021-02-09 DIAGNOSIS — Z71.89 ACP (ADVANCE CARE PLANNING): ICD-10-CM

## 2021-02-09 PROCEDURE — G0438 PPPS, INITIAL VISIT: HCPCS | Performed by: FAMILY MEDICINE

## 2021-02-09 ASSESSMENT — PATIENT HEALTH QUESTIONNAIRE - PHQ9
1. LITTLE INTEREST OR PLEASURE IN DOING THINGS: 0
2. FEELING DOWN, DEPRESSED OR HOPELESS: 0
SUM OF ALL RESPONSES TO PHQ QUESTIONS 1-9: 0
SUM OF ALL RESPONSES TO PHQ QUESTIONS 1-9: 0

## 2021-02-09 ASSESSMENT — LIFESTYLE VARIABLES
HOW MANY STANDARD DRINKS CONTAINING ALCOHOL DO YOU HAVE ON A TYPICAL DAY: 0
HAVE YOU OR SOMEONE ELSE BEEN INJURED AS A RESULT OF YOUR DRINKING: 0
HOW OFTEN DURING THE LAST YEAR HAVE YOU HAD A FEELING OF GUILT OR REMORSE AFTER DRINKING: 0
HAS A RELATIVE, FRIEND, DOCTOR, OR ANOTHER HEALTH PROFESSIONAL EXPRESSED CONCERN ABOUT YOUR DRINKING OR SUGGESTED YOU CUT DOWN: 0
HOW OFTEN DURING THE LAST YEAR HAVE YOU FAILED TO DO WHAT WAS NORMALLY EXPECTED FROM YOU BECAUSE OF DRINKING: 0

## 2021-02-09 NOTE — PROGRESS NOTES
Medicare Annual Wellness Visit  Name: Demetria Condon Date: 2021   MRN: [id number] Sex: Female   Age: 76 y.o. Ethnicity: Non-/Non    : 1955 Race: Karyna Tamayo is here for Medicare AWV    Screenings for behavioral, psychosocial and functional/safety risks, and cognitive dysfunction are all negative except as indicated below. These results, as well as other patient data from the [address] form, are documented in Flowsheets linked to this Encounter. No Known Allergies    Prior to Visit Medications    Medication Sig Taking?  Authorizing Provider   budesonide (RHINOCORT AQUA) 32 MCG/ACT nasal spray 1 spray by Each Nostril route 2 times daily Yes Valentin Mendez MD   Fexofenadine HCl (ALLERGY 24-HR PO) Take by mouth Yes Historical Provider, MD   albuterol sulfate  (90 Base) MCG/ACT inhaler Inhale 2 puffs into the lungs every 4 hours as needed for Wheezing or Shortness of Breath  Patient not taking: Reported on 2020  Valentin Mendez MD   GuaiFENesin ([address] PO) Take by mouth  Historical Provider, MD       Past Medical History:   Diagnosis Date    Allergic rhinitis     Asthma, extrinsic     mild intermittent    Foot fracture 2009    Menopausal or female climacteric states     Tubular adenoma of colon     [address] [place] [facility]       Past Surgical History:   Procedure Laterality Date     [address]       Family History   Problem Relation Age of Onset    Cancer Mother         MGUS vs Mulitple myeloma    Lung Cancer Mother 80        metastatic     Coronary Art Dis Father         Age 76s   Hamida Sevilla Elevated Lipids Father     Other Father         thoracic aortic aneurysm    Prostate Cancer Maternal Grandfather     Lung Cancer Paternal Grandfather        CareTeam (Including outside providers/suppliers regularly involved in providing care):   Patient Care Team:  Valentin Mendez MD as PCP - General (Family Medicine)  Meghann Vazquez MD as PCP - REHABILITATION [facility] Provider    Wt Readings from Last 3 Encounters:   11/19/20 112 lb (50.8 kg)   08/30/19 113 lb (51.3 kg)   07/02/18 110 lb (49.9 kg)     Patient-Reported Vitals 2/9/2021   Patient-Reported Weight 111.7lb   Patient-Reported Height 5f3      There is no height or weight on file to calculate BMI. Based upon direct observation of the patient, evaluation of cognition reveals recent and remote memory intact. She described clock correctly and told me where the hands go to read the time. Physical Exam  Constitutional:       Appearance: Normal appearance. Eyes:      General: No scleral icterus. Pulmonary:      Effort: Pulmonary effort is normal. No respiratory distress. Skin:     Coloration: Skin is not pale. Neurological:      General: No focal deficit present. Mental Status: She is alert and oriented to person, place, and time. Psychiatric:         Mood and Affect: Mood normal.         Behavior: Behavior normal.           Patient's complete Health Risk Assessment and screening values have been reviewed and are found in Flowsheets. The following problems were reviewed today and where indicated follow up appointments were made and/or referrals ordered. Positive Risk Factor Screenings with Interventions:          General Health and ACP:  General  In general, how would you say your health is?: Very Good  In the past 7 days, have you experienced any of the following? New or Increased Pain, New or Increased Fatigue, Loneliness, Social Isolation, Stress or Anger?: None of These  Do you get the social and emotional support that you need?: Yes  Do you have a Living Will?: Yes  Advance Directives     Power of [address] [place] [address] Will ACP-Advance Directive ACP-Power of     Not on File Not on File Not on File Not on File      General Health Risk Interventions:  · asked to get us a copy of her Advance Directives.            Safety:  Safety  Do you have working smoke detectors?: Yes  Have all throw rugs been removed or fastened?: Yes  Do you have non-slip mats or surfaces in all bathtubs/showers?: (!) No  Do all of your stairways have a railing or banister?: Yes  Are your doorways, halls and stairs free of clutter?: Yes  Do you always fasten your seatbelt when you are in a car?: Yes  Safety Interventions:  · Home safety tips provided     Personalized Preventive Plan   Current Health Maintenance Status  Immunization History   Administered Date(s) Administered    DTaP 07/29/2009    Pneumococcal Polysaccharide ([id number]) 11/19/2020    Tdap (Boostrix, Adacel) 08/30/2019        Health Maintenance   Topic Date Due    COVID-19 Vaccine (1 of 2) 12/20/1970    Shingles Vaccine (1 of 2) 12/20/2004    DEXA (modify frequency per FRAX score)  12/20/2009    Annual Wellness Visit (AWV)  04/26/2020    Colon cancer screen colonoscopy  06/17/2021    Breast cancer screen  09/01/2021    Lipid screen  09/14/2024    DTaP/Tdap/Td vaccine (3 - Td) 08/30/2029    Flu vaccine  Completed    Pneumococcal 65+ years Vaccine  Completed    Hepatitis C screen  Completed    Hepatitis A vaccine  Aged Out    Hepatitis B vaccine  Aged Out    Hib vaccine  Aged Out    Meningococcal (ACWY) vaccine  Aged Out     Recommendations for [place] Due: see orders and patient instructions/AVS.  . Recommended screening schedule for the next 5-10 years is provided to the patient in written form: see Patient Instructions/AVS.    Devan Zapata was seen today for medicare awv. Diagnoses and all orders for this visit:    1. Routine general medical examination at a health care facility  Update HM issues  Encouraged her to get Shingrix in Spring or Summer. Had her 1st COVID19 vaccine. Will send me pix of her card after 2nd dose. 2. ACP (advance care planning)  Full code at this time. Has living will. 3. Colon cancer screening  Due in June for 5 year screen.    - [facility] - Barb Brito MD, Gastroenterology, [facility]          Chelsey Zurita is a 76 y.o. female being evaluated by a Virtual Visit (video and audio) encounter to address concerns as mentioned above. A caregiver was present when appropriate. Due to this being a TeleHealth encounter (During FKRZN-58 public health emergency), evaluation of the following organ systems was limited: Vitals/Constitutional/EENT/Resp/CV/GI//MS/Neuro/Skin/Heme-Lymph-Imm. Pursuant to the emergency declaration under the [address] [place] [address], [address] [place] [address] authority and the [employer] Act, this Virtual Visit was conducted with patient's (and/or legal guardian's) consent, to reduce the patient's risk of exposure to COVID-19 and provide necessary medical care. The patient (and/or legal guardian) has also been advised to contact this office for worsening conditions or problems, and seek emergency medical treatment and/or call 911 if deemed necessary. Patient identification was verified at the start of the visit: Yes    Services were provided through a video synchronous discussion virtually to substitute for in-person clinic visit. Patient and provider were located at their individual homes. --Castilol Mcdonnell MD on 2/9/2021 at 10:17 AM    An electronic signature was used to authenticate this note.

## 2021-02-09 NOTE — PATIENT INSTRUCTIONS
Personalized Preventive Plan for Meg Mis - 2/9/2021  Medicare offers a range of preventive health benefits. Some of the tests and screenings are paid in full while other may be subject to a deductible, co-insurance, and/or copay. Some of these benefits include a comprehensive review of your medical history including lifestyle, illnesses that may run in your family, and various assessments and screenings as appropriate. After reviewing your medical record and screening and assessments performed today your provider may have ordered immunizations, labs, imaging, and/or referrals for you. A list of these orders (if applicable) as well as your Preventive Care list are included within your After Visit Summary for your review. Other Preventive Recommendations:    · A preventive eye exam performed by an eye specialist is recommended every 1-2 years to screen for glaucoma; cataracts, macular degeneration, and other eye disorders. · A preventive dental visit is recommended every 6 months. · Try to get at least 150 minutes of exercise per week or 10,000 steps per day on a pedometer . · Order or download the FREE \"Exercise & Physical Activity: Your Everyday Guide\" from [employer]. Call 2-785.126.4134 or search [employer] online. · You need 0593-0861 mg of calcium and 4762-4271 IU of vitamin D per day. It is possible to meet your calcium requirement with diet alone, but a vitamin D supplement is usually necessary to meet this goal.  · When exposed to the sun, use a sunscreen that protects against both UVA and UVB radiation with an SPF of 30 or greater. Reapply every 2 to 3 hours or after sweating, drying off with a towel, or swimming. · Always wear a seat belt when traveling in a car. Always wear a helmet when riding a bicycle or motorcycle.

## 2021-04-24 ENCOUNTER — PATIENT MESSAGE (OUTPATIENT)
Dept: INTERNAL MEDICINE CLINIC | Age: 66
End: 2021-04-24

## 2021-04-24 DIAGNOSIS — J30.2 SEASONAL ALLERGIC RHINITIS, UNSPECIFIED TRIGGER: ICD-10-CM

## 2021-04-26 RX ORDER — MOMETASONE FUROATE 50 UG/1
SPRAY, METERED NASAL
Qty: 3 INHALER | Refills: 2 | Status: SHIPPED | OUTPATIENT
Start: 2021-04-26 | End: 2022-03-03

## 2021-04-26 NOTE — TELEPHONE ENCOUNTER
From: Lorenza Rosario  To: Emma Field MD  Sent: 4/24/2021 6:13 PM EDT  Subject: Prescription Question    Can I have [facility] ?

## 2021-06-01 ENCOUNTER — HOSPITAL ENCOUNTER (OUTPATIENT)
Dept: GENERAL RADIOLOGY | Age: 66
Discharge: HOME | End: 2021-06-01
Payer: MEDICARE

## 2021-06-01 DIAGNOSIS — Z78.0 POSTMENOPAUSAL: ICD-10-CM

## 2021-06-01 PROCEDURE — 77080 DXA BONE DENSITY AXIAL: CPT

## 2021-06-02 PROBLEM — M81.6 LOCALIZED OSTEOPOROSIS WITHOUT CURRENT PATHOLOGICAL FRACTURE: Status: ACTIVE | Noted: 2021-06-02

## 2021-06-29 ENCOUNTER — VIRTUAL VISIT (OUTPATIENT)
Dept: INTERNAL MEDICINE CLINIC | Age: 66
End: 2021-06-29
Payer: MEDICARE

## 2021-06-29 DIAGNOSIS — M81.0 OSTEOPOROSIS WITHOUT CURRENT PATHOLOGICAL FRACTURE, UNSPECIFIED OSTEOPOROSIS TYPE: Primary | ICD-10-CM

## 2021-06-29 PROCEDURE — 99213 OFFICE O/P EST LOW 20 MIN: CPT | Performed by: FAMILY MEDICINE

## 2021-06-29 RX ORDER — ALENDRONATE SODIUM 70 MG/1
TABLET ORAL
Qty: 12 TABLET | Status: SHIPPED
Start: 2021-06-29 | End: 2021-09-28 | Stop reason: SINTOL

## 2021-06-29 NOTE — PATIENT INSTRUCTIONS
Patient Education        Learning About High-Calcium Foods  What foods are high in calcium? The foods you eat contain nutrients, such as vitamins and minerals. Calcium is a nutrient. Your body needs the right amount to stay healthy and work as it should. You can use the list below to help you make choices about which foods to eat. Here are some foods that contain calcium. Fruits  · Figs, dried  · Orange juice, fortified with calcium  Vegetables  · Bok uriel  · Broccoli  · Parker greens  · Kale  Grains  · Cereals, fortified with calcium  Dairy and dairy alternatives  · Cheese  · Milk  · Non-dairy milk (almond, rice, soy), fortified with calcium  · Yogurt  Protein foods  · Almonds  · [place] or sardines, canned with bones  · Soybeans  · Tofu, fortified with calcium  Work with your doctor to find out how much of this nutrient you need. Depending on your health, you may need more or less of it in your diet. Current as of: December 17, 2020               Content Version: 12.9  © 2006-2021 Healthwise[employer]. Care instructions adapted under license by [facility] ([facility]). If you have questions about a medical condition or this instruction, always ask your healthcare professional. [place] [address] any warranty or liability for your use of this information.

## 2021-06-29 NOTE — PROGRESS NOTES
Nohemy Olguin (:  1955) is a 76 y.o. female,Established patient, here for evaluation of the following chief complaint(s): Results (osteoporosis)         ASSESSMENT/PLAN:  1. Osteoporosis without current pathological fracture, unspecified osteoporosis type  Discussed risk of this disorder and medication options. Adequate calcium and Vit D.  - alendronate (FOSAMAX) 70 MG tablet; 1 tab each wk w/8 oz. water only. Remain upright. Do not eat, drink other liquids or take meds for at least 30 min. (Patient not taking: Reported on 2021)  Dispense: 12 tablet; Refill: prn        Follow up prn. SUBJECTIVE/OBJECTIVE:  HPI    FU of DEXA showing osteoporosis in spine and osteopenia in hips. She is feeling fine. Cut back some on running due to hip pain but thinks it is related to soft tissue and not OP. Should be OK on Vitamin D. Spends lots of time outdoors. Not good with calcium in foods. Takes 600 mg per day in supplement. Her father did fracture and had osteoporosis    No back pain. No dental issues or plans for implants or pulled teeth. Outpatient Medications Marked as Taking for the 21 encounter (Virtual Visit) with Ranjith Byrne MD   Medication Sig Dispense Refill    alendronate (FOSAMAX) 70 MG tablet 1 tab each wk w/8 oz. water only. Remain upright. Do not eat, drink other liquids or take meds for at least 30 min. 12 tablet prn    mometasone (NASONEX) 50 MCG/ACT nasal spray 1-2 sprays each nostril daily. Need to use regularly for benefit. 3 Inhaler 2    Fexofenadine HCl (ALLERGY 24-HR PO) Take by mouth      GuaiFENesin (MUCINEX PO) Take by mouth           Review of Systems    Patient-Reported Vitals 2021   Patient-Reported Weight 112lb   Patient-Reported Height 5f3        Physical Exam  Constitutional:       Appearance: Normal appearance. Eyes:      General: No scleral icterus. Pulmonary:      Effort: Pulmonary effort is normal. No respiratory distress. Skin:     Coloration: Skin is not pale. Neurological:      General: No focal deficit present. Mental Status: She is alert and oriented to person, place, and time. Psychiatric:         Mood and Affect: Mood normal.         Behavior: Behavior normal.       Lab Results   Component Value Date     11/19/2020    K 4.3 11/19/2020     11/19/2020    CO2 28 11/19/2020    BUN 21 11/19/2020    CREATININE 0.6 11/19/2020    GLUCOSE 104 11/19/2020    CALCIUM 9.5 11/19/2020      DEXA spine T score minus 3.2. Femoral necks minus 2.0 and minus 2.1       20-25 min      Angela Arguello, was evaluated through a synchronous (real-time) audio-video encounter. The patient (or guardian if applicable) is aware that this is a billable service. Verbal consent to proceed has been obtained within the past 12 months. The visit was conducted pursuant to the emergency declaration under the [address] [place] [address] and the [employer] and [employer] General Act. Patient identification was verified, and a caregiver was present when appropriate. The patient was located in a state where the provider was credentialed to provide care. An electronic signature was used to authenticate this note.     --Daily Ching MD

## 2021-09-07 ENCOUNTER — OFFICE VISIT (OUTPATIENT)
Dept: SURGERY | Age: 66
End: 2021-09-07
Payer: MEDICARE

## 2021-09-07 VITALS — DIASTOLIC BLOOD PRESSURE: 60 MMHG | BODY MASS INDEX: 20.3 KG/M2 | SYSTOLIC BLOOD PRESSURE: 132 MMHG | WEIGHT: 114.6 LBS

## 2021-09-07 DIAGNOSIS — K38.8 HYPERPLASTIC POLYP OF APPENDIX: Primary | ICD-10-CM

## 2021-09-07 PROCEDURE — 99203 OFFICE O/P NEW LOW 30 MIN: CPT | Performed by: SURGERY

## 2021-09-07 ASSESSMENT — ENCOUNTER SYMPTOMS
RESPIRATORY NEGATIVE: 1
ALLERGIC/IMMUNOLOGIC NEGATIVE: 1
GASTROINTESTINAL NEGATIVE: 1
EYES NEGATIVE: 1

## 2021-09-07 NOTE — PROGRESS NOTES
[facility] GENERAL AND LAPAROSCOPIC SURGERY                       PATIENT NAME: Eileen Canavan        TODAY'S DATE: 2021    Reason for Consult:  Polyp    Requesting Physician / PCP:  Dr. Khoa Head / Dr. Christian Wood:              The patient is a 76 y.o. female who presents with a colon lesion. Pt has had a screening colonoscopy with abnormality of the appendix noted. Pt with inverted appearing appending, and path exam concerning for polyp, with extension down into the appendix. Pt without acute GI sx's. No N/V/D. No F/C. Has not had GI bleeding. No prior intestinal surgery. Past Medical History:        Diagnosis Date    Allergic rhinitis     Asthma, extrinsic     mild intermittent    Foot fracture     Menopausal or female climacteric states     Tubular adenoma of colon     [facility] at [facility]       Past Surgical History:        Procedure Laterality Date     SECTION      TUBAL LIGATION         Current Medications:   No current facility-administered medications for this visit. Prior to Admission medications    Medication Sig Start Date End Date Taking? Authorizing Provider   alendronate (FOSAMAX) 70 MG tablet 1 tab each wk w/8 oz. water only. Remain upright. Do not eat, drink other liquids or take meds for at least 30 min. 21  Yes Nicolas Alberts MD   Calcium Carbonate (CALCIUM-CARB 600 PO) Take 1 tablet by mouth daily   Yes Historical Provider, MD   mometasone (NASONEX) 50 MCG/ACT nasal spray 1-2 sprays each nostril daily. Need to use regularly for benefit.  21  Yes Nicolas Alberts MD   albuterol sulfate  (90 Base) MCG/ACT inhaler Inhale 2 puffs into the lungs every 4 hours as needed for Wheezing or Shortness of Breath 6/15/17  Yes Nicolas Alberts MD   Fexofenadine HCl (ALLERGY 24-HR PO) Take by mouth   Yes Historical Provider, MD   GuaiFENesin (MUCINEX PO) Take by mouth   Yes Historical Provider, MD Allergies:  Patient has no known allergies. Social History:    reports that she quit smoking about 33 years ago. Her smoking use included cigarettes. She has a 1.20 pack-year smoking history. She has never used smokeless tobacco. She reports current alcohol use. She reports that she does not use drugs. Family History:        Problem Relation Age of Onset    Cancer Mother         MGUS vs Mulitple myeloma    Lung Cancer Mother 80        metastatic     Coronary Art Dis Father         Age 74s    Elevated Lipids Father     Other Father         thoracic aortic aneurysm    Osteoporosis Father     Prostate Cancer Maternal Grandfather     Lung Cancer Paternal Grandfather        REVIEW OF SYSTEMS:  Review of Systems   Constitutional: Negative. HENT: Negative. Eyes: Negative. Respiratory: Negative. Cardiovascular: Negative. Gastrointestinal: Negative. Endocrine: Negative. Genitourinary: Negative. Musculoskeletal: Negative. Skin: Negative. Allergic/Immunologic: Negative. Neurological: Negative. Hematological: Negative. Psychiatric/Behavioral: Negative.         PHYSICAL EXAM:  VITALS:  /60   Wt 114 lb 9.6 oz (52 kg)   BMI 20.30 kg/m²   CONSTITUTIONAL:  alert, no apparent distress and normal weight  EYES:  sclera clear  ENT:  normocepalic, without obvious abnormality  NECK:  supple, symmetrical, trachea midline  LUNGS:  clear to auscultation  CARDIOVASCULAR:  regular rate and rhythm and no murmur noted  ABDOMEN:  scars noted are healed, normal bowel sounds, soft, non-distended, non-tender, voluntary guarding absent, no masses palpated, no hepatosplenomegally and hernia absent  MUSCULOSKELETAL:  0+ pitting edema lower extremities  NEUROLOGIC:  Mental Status Exam:  Level of Alertness:   awake  Orientation:   person, place, time  SKIN:  no bruising or bleeding, normal skin color, texture, turgor and no redness, warmth, or swelling        Radiology Review: None    IMPRESSION/RECOMMENDATIONS:    Appendiceal polyp, extension into appendix. Will require lap appy to completely remove and eval path fully    The problem and plan were discussed in detail with the patient today. All questions have been answered, and they agree to proceed. Will plan to do Lap appy as outpt at [facility].        Yon Gonzalez MD

## 2021-09-27 ENCOUNTER — PATIENT MESSAGE (OUTPATIENT)
Dept: INTERNAL MEDICINE CLINIC | Age: 66
End: 2021-09-27

## 2021-09-28 NOTE — TELEPHONE ENCOUNTER
From: Johnny Yarbrough  To: Napoleon Jaquez MD  Sent: 9/27/2021 7:50 PM EDT  Subject: Prescription Question    I have been taking Alendronate Sodium 70 mg. for several months now. I take it Saturday mornings with 8oz water and don't eat etc for 30 min. The last few times I have taken it I have noticed that by Sunday afternoon my stomach starts hurting , is bloated and I have a lot of gas . I do take Gas X for it but it tends to be a problem until later Monday or Tuesday . I do tend to have a sensitive stomach but am wondering if the issues Iately are due to this particular drug . Thank you.  Chloe Reyes

## 2021-10-22 ENCOUNTER — HOSPITAL ENCOUNTER (OUTPATIENT)
Dept: WOMENS IMAGING | Age: 66
Discharge: HOME | End: 2021-10-22
Payer: MEDICARE

## 2021-10-22 VITALS — BODY MASS INDEX: 19.84 KG/M2 | HEIGHT: 63 IN | WEIGHT: 112 LBS

## 2021-10-22 DIAGNOSIS — Z12.31 VISIT FOR SCREENING MAMMOGRAM: ICD-10-CM

## 2021-10-22 PROCEDURE — 77063 BREAST TOMOSYNTHESIS BI: CPT

## 2021-12-01 RX ORDER — M-VIT,TX,IRON,MINS/CALC/FOLIC 27MG-0.4MG
1 TABLET ORAL DAILY
COMMUNITY

## 2021-12-01 NOTE — PROGRESS NOTES
Name_______________________________________Printed:____________________  Date and time of surgery___ 12/3 0730_____________________Arrival Time:__0600______________   1. The instructions given regarding when and if a patient needs to stop oral intake prior to surgery varies. Follow the specific instructions you were given                  _x__Nothing to eat or to drink after Midnight the night before.                   ____Carbo loading or ERAS instructions will be given to select patients-if you have been given those instructions -please do the following                           The evening before your surgery after dinner before midnight drink 40 ounces of gatorade. If you are diabetic use sugar free. The morning of surgery drink 40 ounces of water. This needs to be finished 3 hours prior to your surgery start time. 2. Take the following pills with a small sip of water on the morning of surgery___none________________________________________________                  Do not take blood pressure medications ending in pril or sartan the jesse prior to surgery or the morning of surgery_   3. Aspirin, Ibuprofen, Advil, Naproxen, Vitamin E and other Anti-inflammatory products and supplements should be stopped for 5 -7days before surgery or as directed by your physician. 4. Check with your Doctor regarding stopping Plavix, Coumadin,Eliquis, Lovenox,Effient,Pradaxa,Xarelto, Fragmin or other blood thinners and follow their instructions. 5. Do not smoke, and do not drink any alcoholic beverages 24 hours prior to surgery. This includes NA Beer. Refrain from the usage of any recreational drugs. 6. You may brush your teeth and gargle the morning of surgery. DO NOT SWALLOW WATER   7. You MUST make arrangements for a responsible adult to stay on site while you are here and take you home after your surgery. You will not be allowed to leave alone or drive yourself home.   It is strongly suggested someone stay with you the first 24 hrs. Your surgery will be cancelled if you do not have a ride home. 8. A parent/legal guardian must accompany a child scheduled for surgery and plan to stay at the hospital until the child is discharged. Please do not bring other children with you. 9. Please wear simple, loose fitting clothing to the hospital.  [place] not bring valuables (money, credit cards, checkbooks, etc.) Do not wear any makeup (including no eye makeup) or nail polish on your fingers or toes. 10. DO NOT wear any jewelry or piercings on day of surgery. All body piercing jewelry must be removed. 11. If you have ___dentures, they will be removed before going to the OR; we will provide you a container. If you wear ___contact lenses or ___glasses, they will be removed; please bring a case for them. 12. Please see your family doctor/pediatrician for a history & physical and/or concerning medications. Bring any test results/reports from your physician's office. PCP__________________Phone___________H&P Appt. Date________             13 If you  have a Living Will and Durable Power of  for Healthcare, please bring in a copy. 15. Notify your Surgeon if you develop any illness between now and surgery  time, cough, cold, fever, sore throat, nausea, vomiting, etc.  Please notify your surgeon if you experience dizziness, shortness of breath or blurred vision between now & the time of your surgery             15. DO NOT shave your operative site 96 hours prior to surgery. For face & neck surgery, men may use an electric razor 48 hours prior to surgery. 16. Shower the night before or morning of surgery using an antibacterial soap or as you have been instructed. 17. To provide excellent care visitors will be limited to one in the room at any given time. 18.  Please bring picture ID and insurance card.              19.  Visit our web site for additional information:  [email or web address]              20.During flu season no children under the age of 15 are permitted in the hospital for the safety of all patients. 21. If you take a long acting insulin in the evening only  take half of your usual  dose the night  before your procedure              22. If you use a c-pap please bring DOS if staying overnight,             23.For your convenience [facility] has a pharmacy on site to fill your prescriptions. 24. If you use oxygen and have a portable tank please bring it  with you the DOS             25. Bring a complete list of all your medications with name and dose include any supplements. 26. Other__________________________________________   *Please call pre admission testing if you any further questions   Penny Topete   [address] [place] [address]. [facility]  [phone]   [facility]    ___ Covid test to be done 3-5 days prior to scheduled surgery -patient aware they are REQUIRED to bring a copy of the negative result DOS-if they receive a positive result to notify their surgeon         If known - indicate where patient is getting covid test done ___________________________________________________________    ___ [facility] - DOS    _x__ Other____patient to bring neg. results_______________________________      Verdishanna Warner POLICY(subject to change)    There is a one visitor policy at [facility] for all surgeries and endoscopies. Whether the visitor can stay or will be asked to wait in the car will depend on the current policy and if social distancing can be maintained. The policy is subject to change at any time. Please make sure the visitor has a cell phone that is on,charged and able to accept calls, as this may be the way that the staff communicates with them. Pain management is NO VISITOR policyThe patients ride is expected to remain in the car with a cell phone for communication. If the ride is leaving the hospital grounds please make sure they are back in time for pickup. Have the patient inform the staff on arrival what their rides plans are while the patient is in the facility. At the MAIN there is one visitor allowed. Please note that the visitor policy is subject to change. All above information reviewed with patient in person or by phone. Patient verbalizes understanding. All questions and concerns addressed.                                                                                                  Patient/Rep____________________                                                                                                                   Per phone/pt                 PRE OP INSTRUCTIONS

## 2021-12-03 ENCOUNTER — ANESTHESIA (OUTPATIENT)
Dept: OPERATING ROOM | Age: 66
End: 2021-12-03
Payer: MEDICARE

## 2021-12-03 ENCOUNTER — ANESTHESIA EVENT (OUTPATIENT)
Dept: OPERATING ROOM | Age: 66
End: 2021-12-03
Payer: MEDICARE

## 2021-12-03 ENCOUNTER — HOSPITAL ENCOUNTER (OUTPATIENT)
Age: 66
Setting detail: OUTPATIENT SURGERY
Discharge: HOME | End: 2021-12-03
Attending: SURGERY | Admitting: SURGERY
Payer: MEDICARE

## 2021-12-03 VITALS
OXYGEN SATURATION: 99 % | HEIGHT: 63 IN | WEIGHT: 117 LBS | SYSTOLIC BLOOD PRESSURE: 142 MMHG | DIASTOLIC BLOOD PRESSURE: 68 MMHG | RESPIRATION RATE: 15 BRPM | BODY MASS INDEX: 20.73 KG/M2 | HEART RATE: 53 BPM | TEMPERATURE: 97 F

## 2021-12-03 VITALS
DIASTOLIC BLOOD PRESSURE: 57 MMHG | RESPIRATION RATE: 3 BRPM | OXYGEN SATURATION: 100 % | SYSTOLIC BLOOD PRESSURE: 110 MMHG

## 2021-12-03 DIAGNOSIS — K38.8 HYPERPLASTIC POLYP OF APPENDIX: Primary | ICD-10-CM

## 2021-12-03 PROCEDURE — 3700000001 HC ADD 15 MINUTES (ANESTHESIA): Performed by: SURGERY

## 2021-12-03 PROCEDURE — 7100000010 HC PHASE II RECOVERY - FIRST 15 MIN: Performed by: SURGERY

## 2021-12-03 PROCEDURE — 3700000000 HC ANESTHESIA ATTENDED CARE: Performed by: SURGERY

## 2021-12-03 PROCEDURE — 2580000003 HC RX 258: Performed by: SURGERY

## 2021-12-03 PROCEDURE — 6360000002 HC RX W HCPCS: Performed by: NURSE ANESTHETIST, CERTIFIED REGISTERED

## 2021-12-03 PROCEDURE — 44970 LAPAROSCOPY APPENDECTOMY: CPT | Performed by: SURGERY

## 2021-12-03 PROCEDURE — 2500000003 HC RX 250 WO HCPCS: Performed by: NURSE ANESTHETIST, CERTIFIED REGISTERED

## 2021-12-03 PROCEDURE — 7100000001 HC PACU RECOVERY - ADDTL 15 MIN: Performed by: SURGERY

## 2021-12-03 PROCEDURE — 2709999900 HC NON-CHARGEABLE SUPPLY: Performed by: SURGERY

## 2021-12-03 PROCEDURE — 2580000003 HC RX 258: Performed by: NURSE ANESTHETIST, CERTIFIED REGISTERED

## 2021-12-03 PROCEDURE — 3600000014 HC SURGERY LEVEL 4 ADDTL 15MIN: Performed by: SURGERY

## 2021-12-03 PROCEDURE — 88304 TISSUE EXAM BY PATHOLOGIST: CPT

## 2021-12-03 PROCEDURE — 6360000002 HC RX W HCPCS: Performed by: SURGERY

## 2021-12-03 PROCEDURE — 2500000003 HC RX 250 WO HCPCS: Performed by: SURGERY

## 2021-12-03 PROCEDURE — 7100000000 HC PACU RECOVERY - FIRST 15 MIN: Performed by: SURGERY

## 2021-12-03 PROCEDURE — 3600000004 HC SURGERY LEVEL 4 BASE: Performed by: SURGERY

## 2021-12-03 PROCEDURE — 7100000011 HC PHASE II RECOVERY - ADDTL 15 MIN: Performed by: SURGERY

## 2021-12-03 PROCEDURE — 2720000010 HC SURG SUPPLY STERILE: Performed by: SURGERY

## 2021-12-03 RX ORDER — SODIUM CHLORIDE, SODIUM LACTATE, POTASSIUM CHLORIDE, CALCIUM CHLORIDE 600; 310; 30; 20 MG/100ML; MG/100ML; MG/100ML; MG/100ML
INJECTION, SOLUTION INTRAVENOUS CONTINUOUS PRN
Status: DISCONTINUED | OUTPATIENT
Start: 2021-12-03 | End: 2021-12-03 | Stop reason: SDUPTHER

## 2021-12-03 RX ORDER — MAGNESIUM HYDROXIDE 1200 MG/15ML
LIQUID ORAL CONTINUOUS PRN
Status: COMPLETED | OUTPATIENT
Start: 2021-12-03 | End: 2021-12-03

## 2021-12-03 RX ORDER — FENTANYL CITRATE 50 UG/ML
INJECTION, SOLUTION INTRAMUSCULAR; INTRAVENOUS PRN
Status: DISCONTINUED | OUTPATIENT
Start: 2021-12-03 | End: 2021-12-03 | Stop reason: SDUPTHER

## 2021-12-03 RX ORDER — LIDOCAINE HYDROCHLORIDE 20 MG/ML
INJECTION, SOLUTION EPIDURAL; INFILTRATION; INTRACAUDAL; PERINEURAL PRN
Status: DISCONTINUED | OUTPATIENT
Start: 2021-12-03 | End: 2021-12-03 | Stop reason: SDUPTHER

## 2021-12-03 RX ORDER — SODIUM CHLORIDE 0.9 % (FLUSH) 0.9 %
5-40 SYRINGE (ML) INJECTION PRN
Status: DISCONTINUED | OUTPATIENT
Start: 2021-12-03 | End: 2021-12-03 | Stop reason: HOSPADM

## 2021-12-03 RX ORDER — GABAPENTIN 100 MG/1
100 CAPSULE ORAL 3 TIMES DAILY
Status: DISCONTINUED | OUTPATIENT
Start: 2021-12-03 | End: 2021-12-03 | Stop reason: HOSPADM

## 2021-12-03 RX ORDER — ROCURONIUM BROMIDE 10 MG/ML
INJECTION, SOLUTION INTRAVENOUS PRN
Status: DISCONTINUED | OUTPATIENT
Start: 2021-12-03 | End: 2021-12-03 | Stop reason: SDUPTHER

## 2021-12-03 RX ORDER — DEXAMETHASONE 4 MG/1
8 TABLET ORAL ONCE
Status: DISCONTINUED | OUTPATIENT
Start: 2021-12-03 | End: 2021-12-03 | Stop reason: HOSPADM

## 2021-12-03 RX ORDER — ACETAMINOPHEN 325 MG/1
650 TABLET ORAL EVERY 4 HOURS PRN
Status: DISCONTINUED | OUTPATIENT
Start: 2021-12-03 | End: 2021-12-03 | Stop reason: HOSPADM

## 2021-12-03 RX ORDER — TRAMADOL HYDROCHLORIDE 50 MG/1
50 TABLET ORAL ONCE
Status: DISCONTINUED | OUTPATIENT
Start: 2021-12-03 | End: 2021-12-03 | Stop reason: HOSPADM

## 2021-12-03 RX ORDER — SODIUM CHLORIDE, SODIUM LACTATE, POTASSIUM CHLORIDE, CALCIUM CHLORIDE 600; 310; 30; 20 MG/100ML; MG/100ML; MG/100ML; MG/100ML
INJECTION, SOLUTION INTRAVENOUS CONTINUOUS
Status: DISCONTINUED | OUTPATIENT
Start: 2021-12-03 | End: 2021-12-03 | Stop reason: HOSPADM

## 2021-12-03 RX ORDER — PROPOFOL 10 MG/ML
INJECTION, EMULSION INTRAVENOUS PRN
Status: DISCONTINUED | OUTPATIENT
Start: 2021-12-03 | End: 2021-12-03 | Stop reason: SDUPTHER

## 2021-12-03 RX ORDER — HYDRALAZINE HYDROCHLORIDE 20 MG/ML
5 INJECTION INTRAMUSCULAR; INTRAVENOUS EVERY 10 MIN PRN
Status: DISCONTINUED | OUTPATIENT
Start: 2021-12-03 | End: 2021-12-03 | Stop reason: HOSPADM

## 2021-12-03 RX ORDER — PHENYLEPHRINE HCL IN 0.9% NACL 1 MG/10 ML
SYRINGE (ML) INTRAVENOUS PRN
Status: DISCONTINUED | OUTPATIENT
Start: 2021-12-03 | End: 2021-12-03 | Stop reason: SDUPTHER

## 2021-12-03 RX ORDER — LIDOCAINE HYDROCHLORIDE 10 MG/ML
1 INJECTION, SOLUTION EPIDURAL; INFILTRATION; INTRACAUDAL; PERINEURAL
Status: DISCONTINUED | OUTPATIENT
Start: 2021-12-03 | End: 2021-12-03 | Stop reason: HOSPADM

## 2021-12-03 RX ORDER — HALOPERIDOL 5 MG/ML
1 INJECTION INTRAMUSCULAR
Status: DISCONTINUED | OUTPATIENT
Start: 2021-12-03 | End: 2021-12-03 | Stop reason: HOSPADM

## 2021-12-03 RX ORDER — HYDROCODONE BITARTRATE AND ACETAMINOPHEN 5; 325 MG/1; MG/1
1 TABLET ORAL EVERY 4 HOURS PRN
Qty: 15 TABLET | Refills: 0 | Status: SHIPPED | OUTPATIENT
Start: 2021-12-03 | End: 2021-12-10

## 2021-12-03 RX ORDER — DEXAMETHASONE SODIUM PHOSPHATE 4 MG/ML
INJECTION, SOLUTION INTRA-ARTICULAR; INTRALESIONAL; INTRAMUSCULAR; INTRAVENOUS; SOFT TISSUE PRN
Status: DISCONTINUED | OUTPATIENT
Start: 2021-12-03 | End: 2021-12-03 | Stop reason: SDUPTHER

## 2021-12-03 RX ORDER — ONDANSETRON 2 MG/ML
INJECTION INTRAMUSCULAR; INTRAVENOUS PRN
Status: DISCONTINUED | OUTPATIENT
Start: 2021-12-03 | End: 2021-12-03 | Stop reason: SDUPTHER

## 2021-12-03 RX ORDER — BUPIVACAINE HYDROCHLORIDE 5 MG/ML
INJECTION, SOLUTION EPIDURAL; INTRACAUDAL
Status: COMPLETED | OUTPATIENT
Start: 2021-12-03 | End: 2021-12-03

## 2021-12-03 RX ORDER — SODIUM CHLORIDE 9 MG/ML
25 INJECTION, SOLUTION INTRAVENOUS PRN
Status: DISCONTINUED | OUTPATIENT
Start: 2021-12-03 | End: 2021-12-03 | Stop reason: HOSPADM

## 2021-12-03 RX ORDER — SODIUM CHLORIDE 0.9 % (FLUSH) 0.9 %
5-40 SYRINGE (ML) INJECTION EVERY 12 HOURS SCHEDULED
Status: DISCONTINUED | OUTPATIENT
Start: 2021-12-03 | End: 2021-12-03 | Stop reason: HOSPADM

## 2021-12-03 RX ORDER — FENTANYL CITRATE 50 UG/ML
25 INJECTION, SOLUTION INTRAMUSCULAR; INTRAVENOUS EVERY 10 MIN PRN
Status: DISCONTINUED | OUTPATIENT
Start: 2021-12-03 | End: 2021-12-03 | Stop reason: HOSPADM

## 2021-12-03 RX ORDER — CIPROFLOXACIN 2 MG/ML
400 INJECTION, SOLUTION INTRAVENOUS EVERY 12 HOURS
Status: DISCONTINUED | OUTPATIENT
Start: 2021-12-03 | End: 2021-12-03 | Stop reason: HOSPADM

## 2021-12-03 RX ORDER — LABETALOL HYDROCHLORIDE 5 MG/ML
5 INJECTION, SOLUTION INTRAVENOUS EVERY 10 MIN PRN
Status: DISCONTINUED | OUTPATIENT
Start: 2021-12-03 | End: 2021-12-03 | Stop reason: HOSPADM

## 2021-12-03 RX ADMIN — DEXAMETHASONE SODIUM PHOSPHATE 8 MG: 4 INJECTION, SOLUTION INTRAMUSCULAR; INTRAVENOUS at 07:55

## 2021-12-03 RX ADMIN — ROCURONIUM BROMIDE 10 MG: 10 INJECTION, SOLUTION INTRAVENOUS at 07:56

## 2021-12-03 RX ADMIN — SUGAMMADEX 200 MG: 100 INJECTION, SOLUTION INTRAVENOUS at 08:29

## 2021-12-03 RX ADMIN — SODIUM CHLORIDE, POTASSIUM CHLORIDE, SODIUM LACTATE AND CALCIUM CHLORIDE: 600; 310; 30; 20 INJECTION, SOLUTION INTRAVENOUS at 07:30

## 2021-12-03 RX ADMIN — ROCURONIUM BROMIDE 30 MG: 10 INJECTION, SOLUTION INTRAVENOUS at 07:42

## 2021-12-03 RX ADMIN — FENTANYL CITRATE 100 MCG: 50 INJECTION, SOLUTION INTRAMUSCULAR; INTRAVENOUS at 07:41

## 2021-12-03 RX ADMIN — ONDANSETRON 4 MG: 2 INJECTION INTRAMUSCULAR; INTRAVENOUS at 07:55

## 2021-12-03 RX ADMIN — CIPROFLOXACIN 400 MG: 2 INJECTION, SOLUTION INTRAVENOUS at 07:42

## 2021-12-03 RX ADMIN — Medication 200 MCG: at 08:26

## 2021-12-03 RX ADMIN — METRONIDAZOLE 500 MG: 500 INJECTION, SOLUTION INTRAVENOUS at 07:25

## 2021-12-03 RX ADMIN — LIDOCAINE HYDROCHLORIDE 60 MG: 20 INJECTION, SOLUTION EPIDURAL; INFILTRATION; INTRACAUDAL; PERINEURAL at 07:42

## 2021-12-03 RX ADMIN — PROPOFOL 120 MG: 10 INJECTION, EMULSION INTRAVENOUS at 07:42

## 2021-12-03 ASSESSMENT — PAIN SCALES - GENERAL
PAINLEVEL_OUTOF10: 0

## 2021-12-03 ASSESSMENT — PULMONARY FUNCTION TESTS
PIF_VALUE: 20
PIF_VALUE: 18
PIF_VALUE: 13
PIF_VALUE: 19
PIF_VALUE: 22
PIF_VALUE: 13
PIF_VALUE: 14
PIF_VALUE: 23
PIF_VALUE: 13
PIF_VALUE: 14
PIF_VALUE: 13
PIF_VALUE: 12
PIF_VALUE: 13
PIF_VALUE: 0
PIF_VALUE: 13
PIF_VALUE: 20
PIF_VALUE: 13
PIF_VALUE: 16
PIF_VALUE: 5
PIF_VALUE: 13
PIF_VALUE: 20
PIF_VALUE: 20
PIF_VALUE: 13
PIF_VALUE: 19
PIF_VALUE: 24
PIF_VALUE: 13
PIF_VALUE: 12
PIF_VALUE: 13
PIF_VALUE: 20
PIF_VALUE: 2
PIF_VALUE: 13
PIF_VALUE: 20
PIF_VALUE: 17
PIF_VALUE: 13
PIF_VALUE: 20
PIF_VALUE: 18
PIF_VALUE: 13
PIF_VALUE: 16
PIF_VALUE: 18
PIF_VALUE: 0
PIF_VALUE: 14
PIF_VALUE: 20
PIF_VALUE: 12
PIF_VALUE: 20
PIF_VALUE: 13
PIF_VALUE: 12
PIF_VALUE: 19
PIF_VALUE: 13
PIF_VALUE: 20
PIF_VALUE: 14
PIF_VALUE: 13
PIF_VALUE: 12

## 2021-12-03 NOTE — PROGRESS NOTES
Pt handoff received from Peg PACU RN at bedside, pt awake alert and oriented,vss, pt has no complaints at this time.

## 2021-12-03 NOTE — ANESTHESIA POSTPROCEDURE EVALUATION
Department of Anesthesiology  Postprocedure Note    Patient: Angeli Perez  MRN: [id number]  YOB: 1955  Date of evaluation: 12/3/2021  Time:  9:10 AM     Procedure Summary     Date: 12/03/21 Room / Location: [address]    Anesthesia Start: 0730 Anesthesia Stop: 0261    Procedure: LAPAROSCOPIC APPENDECTOMY (N/A Abdomen) Diagnosis: (K38.8  APPENDICEAL POLYP)    Surgeons: Lorenza Henning MD Responsible Provider: Akin Lopez MD    Anesthesia Type: general ASA Status: 2          Anesthesia Type: general    Bethel Phase I: Bethel Score: 5    Bethel Phase II:      Last vitals: Reviewed and per EMR flowsheets.        Anesthesia Post Evaluation    Patient location during evaluation: PACU  Patient participation: complete - patient participated  Pain score: 0  Complications: no  Cardiovascular status: hemodynamically stable  Respiratory status: acceptable  Multimodal analgesia pain management approach

## 2021-12-03 NOTE — ANESTHESIA PRE PROCEDURE
Department of Anesthesiology  Preprocedure Note       Name:  Carlton Dahl   Age:  76 y.o.  :  1955                                          MRN:  [id number]         Date:  12/3/2021      Surgeon: Jacobo Veronica):  Rambo Page MD    Procedure: Procedure(s):  LAPAROSCOPIC APPENDECTOMY    Medications prior to admission:   Prior to Admission medications    Medication Sig Start Date End Date Taking? Authorizing Provider   Multiple Vitamins-Minerals (THERAPEUTIC MULTIVITAMIN-MINERALS) tablet Take 1 tablet by mouth daily   Yes Historical Provider, MD   mometasone (NASONEX) 50 MCG/ACT nasal spray 1-2 sprays each nostril daily. Need to use regularly for benefit. 21  Yes Marilynn Asher MD   Fexofenadine HCl (ALLERGY 24-HR PO) Take by mouth   Yes Historical Provider, MD   GuaiFENesin (MUCINEX PO) Take by mouth   Yes Historical Provider, MD   Calcium Carbonate (CALCIUM-CARB 600 PO) Take 1 tablet by mouth daily    Historical Provider, MD   albuterol sulfate  (90 Base) MCG/ACT inhaler Inhale 2 puffs into the lungs every 4 hours as needed for Wheezing or Shortness of Breath 6/15/17   Marilynn Asher MD       Current medications:    No current facility-administered medications for this encounter. Allergies:     Allergies   Allergen Reactions    Ibuprofen      GI upset       Problem List:    Patient Active Problem List   Diagnosis Code    Allergic rhinitis J30.9    Asthma, extrinsic J45.909    Tubular adenoma of colon D12.6    Osteoporosis M81.6       Past Medical History:        Diagnosis Date    Allergic rhinitis     Asthma, extrinsic     mild intermittent    Foot fracture     Menopausal or female climacteric states     Tubular adenoma of colon     [facility] at [facility]       Past Surgical History:        Procedure Laterality Date     SECTION      COLONOSCOPY      TUBAL LIGATION      WISDOM TOOTH EXTRACTION         Social History:    Social History Tobacco Use    Smoking status: Former Smoker     Packs/day: 0.30     Years: 4.00     Pack years: 1.20     Types: Cigarettes     Quit date: 1988     Years since quittin.9    Smokeless tobacco: Never Used    Tobacco comment: quit around age 28   Substance Use Topics    Alcohol use: Yes     Alcohol/week: 0.0 - 0.8 standard drinks                                Counseling given: Not Answered  Comment: quit around age 28      Vital Signs (Current):   Vitals:    21 1450   Weight: 112 lb (50.8 kg)   Height: 5' 2.5\" (1.588 m)                                              BP Readings from Last 3 Encounters:   21 132/60   20 110/70   19 122/72       NPO Status: Time of last liquid consumption:                         Time of last solid consumption:                         Date of last liquid consumption: 21                        Date of last solid food consumption: 21    BMI:   Wt Readings from Last 3 Encounters:   21 112 lb (50.8 kg)   10/22/21 112 lb (50.8 kg)   21 114 lb 9.6 oz (52 kg)     Body mass index is 20.16 kg/m². CBC:   Lab Results   Component Value Date    WBC 3.6 2020    RBC 4.92 2020    HGB 14.3 2020    HCT 43.0 2020    MCV 87.4 2020    RDW 14.4 2020     2020       CMP:   Lab Results   Component Value Date     2020    K 4.3 2020     2020    CO2 28 2020    BUN 21 2020    CREATININE 0.6 2020    GFRAA >60 2020    GFRAA >60 2012    AGRATIO 2.0 2020    LABGLOM >60 2020    GLUCOSE 104 2020    PROT 6.4 2020    PROT 6.7 2012    CALCIUM 9.5 2020    BILITOT 0.4 2020    ALKPHOS 100 2020    AST 36 2020    ALT 26 2020       POC Tests: No results for input(s): POCGLU, POCNA, POCK, POCCL, POCBUN, POCHEMO, POCHCT in the last 72 hours.     Coags: No results found for: PROTIME, INR, APTT    HCG (If Applicable): No results found for: PREGTESTUR, PREGSERUM, HCG, HCGQUANT     ABGs: No results found for: PHART, PO2ART, KWZ6UEH, IYI7YDY, BEART, R2DBPWCB     Type & Screen (If Applicable):  No results found for: LABABO, LABRH    Drug/Infectious Status (If Applicable):  No results found for: HIV, HEPCAB    COVID-19 Screening (If Applicable):   Lab Results   Component Value Date    COVID19 NOT DETECTED 08/17/2020           Anesthesia Evaluation  Nursing notes reviewed no history of anesthetic complications:   Airway: Mallampati: II  TM distance: >3 FB   Neck ROM: full  Mouth opening: > = 3 FB Dental: normal exam         Pulmonary: breath sounds clear to auscultation  (+) asthma:                           ROS comment: Mild intermitent   Cardiovascular:Negative CV ROS            Rhythm: regular  Rate: normal           Beta Blocker:  Not on Beta Blocker         Neuro/Psych:   Negative Neuro/Psych ROS              GI/Hepatic/Renal: Neg GI/Hepatic/Renal ROS            Endo/Other: Negative Endo/Other ROS                    Abdominal:   (+) scaphoid          Vascular: negative vascular ROS. Other Findings:             Anesthesia Plan      general     ASA 2       Induction: intravenous. MIPS: Postoperative opioids intended and Prophylactic antiemetics administered. Anesthetic plan and risks discussed with patient.                       Tru Rand MD   12/3/2021

## 2021-12-03 NOTE — OP NOTE
[facility]                     [address] [facility], [address] [facility]                                OPERATIVE REPORT    PATIENT NAME: Jolene Owens                      :        1955  MED REC NO:   [id number]                          ROOM:  ACCOUNT NO:   [de-identified]                           ADMIT DATE: 2021  PROVIDER:     Ira Lan MD    DATE OF PROCEDURE:  2021    PREOPERATIVE DIAGNOSIS:  Appendiceal polyp. POSTOPERATIVE DIAGNOSIS:  Appendiceal polyp. PROCEDURE:  Laparoscopic appendectomy including base of cecum. SURGEON:  Ira Lan MD    ANESTHESIA:  General plus local.    ESTIMATED BLOOD LOSS:  Minimal.    COMPLICATIONS:  None. INDICATION:  The patient is a 65-year-old presenting post colonoscopy  with appendiceal polyp and inverted appendix noted colonoscopically. The patient had a polyp sample, but was not amenable to complete  endoscopic removal and presents for resection today. FINDINGS:  In terms of findings at surgery, this area was removed along  with the lower portion of the base of the cecum taken up to the junction  of the ileum into the cecum at the ileocecal valve to have a clear  margin. 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 The duodenum was not  across the midline and comes straight down from the stomach and most of  the small bowel was lying towards the right abdomen going into the cecum  and the terminal ileum which was down in the pelvis with the right  colon, then ascending in the mid to left side of the abdomen  retroperitoneally deep to the small bowel and then coming back down the  left side through the sigmoid colon more in its normal anatomic position  through the rectum. There was no evidence of abnormal location of the  liver or gallbladder. Anatomy was noted, but left in its natural  position.     ADDITIONAL DETAILS OF SURGERY:  The patient was brought to the operating  room and placed on the operating table in the supine position. Compression boots were placed. General anesthetic was administered. The abdomen was prepped and draped sterilely and time-out was done. Supraumbilical 5-mm direct entry view port was placed and the abdominal  cavity was insufflated to 15 mmHg pressure. A lower abdominal 5-mm port  and a left lateral abdominal 12-mm port were placed under direct vision. The patient's internal intestinal anatomy was visualized as noted above. The cecum was identified down in the pelvis and elevated. The  vasculature to the appendix of the mesoappendix and the pericecal fat  pad was then all taken down hemostatically with the LigaSure. We then  stapled across the base of the cecum just below the ileocecal valve with  the [place] 60-mm stapler with the blue load. The appendix was placed  in a specimen retrieval sac and removed out the left lateral abdominal  port site within the bag. The area of the dissection was all checked. All appeared hemostatic. The staple line was intact. The instruments  and ports were removed. The port sites appeared hemostatic. The fascia  at the left abdominal port site was closed with 0 Vicryl figure-of-eight  suture. Skin at all sites closed with 4-0 Monocryl suture. Dermabond  glue was placed. The patient was taken to recovery room in stable  condition postop.         Norvin Angelucci, MD    D: 12/03/2021 8:40:15       T: 12/03/2021 8:44:37     [id number]  Job#: [id number]     Doc#: [id number]    CC:  MD Sarah Ingram MD

## 2021-12-03 NOTE — BRIEF OP NOTE
Brief Postoperative Note      Patient: Edmar De  YOB: 1955  MRN: [id number]    Date of Procedure: 12/3/2021    Pre-Op Diagnosis: K38.8  APPENDICEAL POLYP    Post-Op Diagnosis: Same       Procedure(s):  LAPAROSCOPIC APPENDECTOMY    Surgeon(s):  Nereida Locke MD    Assistant:  Surgical Assistant: Fredrick Miguel    Anesthesia: General    Estimated Blood Loss (mL): Minimal    Complications: None    Specimens:   ID Type Source Tests Collected by Time Destination   A :  Tissue Appendix SURGICAL PATHOLOGY Nereida Locke MD 12/3/2021 0820        Implants:  * No implants in log *      Drains: * No LDAs found *    Findings: Appendix removed, sent for path. Congenital malformation noted.     Electronically signed by Nereida Locke MD on 12/3/2021 at 8:23 AM

## 2021-12-03 NOTE — H&P
PATIENT NAME: Carlton Dahl         TODAY'S DATE: 12/3/2021     HISTORY OF PRESENT ILLNESS:               The patient is a 76 y.o. female who presents with a colon lesion. Pt has had a screening colonoscopy with abnormality of the appendix noted. Pt with inverted appearing appending, and path exam concerning for polyp, with extension down into the appendix. Pt without acute GI sx's. No N/V/D. No F/C. Has not had GI bleeding. No prior intestinal surgery.     Past Medical History:    Past Medical History             Diagnosis Date    Allergic rhinitis      Asthma, extrinsic       mild intermittent    Foot fracture     Menopausal or female climacteric states     Tubular adenoma of colon      [facility]            Past Surgical History:    Past Surgical History             Procedure Laterality Date     SECTION       TUBAL LIGATION               Current Medications:   Current Hospital Medications   No current facility-administered medications for this visit. Home Medications           Prior to Admission medications    Medication Sig Start Date End Date Taking? Authorizing Provider   alendronate (FOSAMAX) 70 MG tablet 1 tab each wk w/8 oz. water only. Remain upright. Do not eat, drink other liquids or take meds for at least 30 min. 21   Yes Marilynn Asher MD   Calcium Carbonate (CALCIUM-CARB 600 PO) Take 1 tablet by mouth daily     Yes Historical Provider, MD   mometasone (NASONEX) 50 MCG/ACT nasal spray 1-2 sprays each nostril daily. Need to use regularly for benefit.  21   Yes Marilynn Asher MD   albuterol sulfate  (90 Base) MCG/ACT inhaler Inhale 2 puffs into the lungs every 4 hours as needed for Wheezing or Shortness of Breath 6/15/17   Yes Marilynn Asher MD   Fexofenadine HCl (ALLERGY 24-HR PO) Take by mouth     Yes Historical Provider, MD   GuaiFENesin (MUCINEX PO) Take by mouth     Yes Historical Provider, MD    Allergies:  Patient has no known allergies.     Social History:    reports that she quit smoking about 33 years ago. Her smoking use included cigarettes. She has a 1.20 pack-year smoking history. She has never used smokeless tobacco. She reports current alcohol use. She reports that she does not use drugs.     Family History:    Family History             Problem Relation Age of Onset    Cancer Mother           MGUS vs Mulitple myeloma    Lung Cancer Mother 80         metastatic     Coronary Art Dis Father           Age 76s   Robertha Rumps Elevated Lipids Father      Other Father           thoracic aortic aneurysm    Osteoporosis Father      Prostate Cancer Maternal Grandfather      Lung Cancer Paternal Grandfather              REVIEW OF SYSTEMS:  Review of Systems   Constitutional: Negative. HENT: Negative. Eyes: Negative. Respiratory: Negative. Cardiovascular: Negative. Gastrointestinal: Negative. Endocrine: Negative. Genitourinary: Negative. Musculoskeletal: Negative. Skin: Negative. Allergic/Immunologic: Negative. Neurological: Negative. Hematological: Negative.     Psychiatric/Behavioral: Negative.          PHYSICAL EXAM:  VITALS:  /60   Wt 114 lb 9.6 oz (52 kg)   BMI 20.30 kg/m²   CONSTITUTIONAL:  alert, no apparent distress and normal weight  EYES:  sclera clear  ENT:  normocepalic, without obvious abnormality  NECK:  supple, symmetrical, trachea midline  LUNGS:  clear to auscultation  CARDIOVASCULAR:  regular rate and rhythm and no murmur noted  ABDOMEN:  scars noted are healed, normal bowel sounds, soft, non-distended, non-tender, voluntary guarding absent, no masses palpated, no hepatosplenomegally and hernia absent  MUSCULOSKELETAL:  0+ pitting edema lower extremities  NEUROLOGIC:  Mental Status Exam:  Level of Alertness:   awake  Orientation:   person, place, time  SKIN:  no bruising or bleeding, normal skin color, texture, turgor and no redness, warmth, or swelling           Radiology Review:   None     IMPRESSION/RECOMMENDATIONS:     Appendiceal polyp, extension into appendix. Will require lap appy to completely remove and eval path fully     The problem and plan were discussed in detail with the patient today.  All questions have been answered, and they agree to proceed.     Will plan to do Lap appy today        Nicole Monahan MD

## 2021-12-03 NOTE — PROGRESS NOTES
CLINICAL PHARMACY NOTE: MEDS TO BEDS    Total # of Prescriptions Filled: 1   The following medications were delivered to the patient:  · Norco 5-325 mg    Additional Documentation:    Delivered to patient =signed  Baljinder Martins CPhT

## 2021-12-21 ENCOUNTER — OFFICE VISIT (OUTPATIENT)
Dept: SURGERY | Age: 66
End: 2021-12-21

## 2021-12-21 VITALS — WEIGHT: 114 LBS | SYSTOLIC BLOOD PRESSURE: 132 MMHG | DIASTOLIC BLOOD PRESSURE: 69 MMHG | BODY MASS INDEX: 20.52 KG/M2

## 2021-12-21 DIAGNOSIS — Z09 POSTOP CHECK: Primary | ICD-10-CM

## 2021-12-21 PROCEDURE — 99024 POSTOP FOLLOW-UP VISIT: CPT | Performed by: SURGERY

## 2021-12-21 NOTE — PROGRESS NOTES
[facility] GENERAL AND LAPAROSCOPIC SURGERY          PATIENT NAME: Peggy Dunne     TODAY'S DATE: 12/21/2021    SUBJECTIVE:    Pt returns post op, fells well, no concerns for recovery. Normal post op diet and activity. OBJECTIVE:  VITALS:  Wt 114 lb (51.7 kg)   BMI 20.52 kg/m²     CONSTITUTIONAL:  awake and alert  ABDOMEN:  normal bowel sounds, soft, non-distended, non-tender, incisions healed, no S/S of infection     Data:    PATHOLOGY  FINAL DIAGNOSIS:     Appendix and portion of cecum, appendectomy:   - Sessile serrated adenoma- See Comment. COMMENT: The sessile serrated adenoma is predominantly located in the   cecum portion and extending to the resection edge (after removing the   staple line). No high grade epithelial dysplasia or invasion is   identified.    JOE/JOE     ASSESSMENT AND PLAN:  S/P Lap cecectomy / appendectomy    Large cecal adenoma extending into appendiceal orifice resected. Margin into staple line noted on path, reviewed in detail with pt. Resection was taken as close to TI / ileocecal valve as technically possible. If additional resection needed will require ileocecectomy. Will D/W Dr. Marcia Austin - propose doing a colonoscopy and eval area / bx in a month or so. See how area appears and do resection if any concern for residual adenoma in staple line region is present. Consider nonsurgical follow up if all looks fine. In addition, malrotation noted at time of surgery, findings reviewed with pt today.     Susie Chaney MD

## 2022-02-24 ENCOUNTER — PATIENT MESSAGE (OUTPATIENT)
Dept: INTERNAL MEDICINE CLINIC | Age: 67
End: 2022-02-24

## 2022-02-24 DIAGNOSIS — H00.021 HORDEOLUM INTERNUM OF RIGHT UPPER EYELID: ICD-10-CM

## 2022-02-24 RX ORDER — POLYMYXIN B SULFATE AND TRIMETHOPRIM 1; 10000 MG/ML; [USP'U]/ML
1 SOLUTION OPHTHALMIC
Qty: 1 EACH | Refills: 0 | Status: SHIPPED | OUTPATIENT
Start: 2022-02-24 | End: 2022-03-06

## 2022-02-24 NOTE — TELEPHONE ENCOUNTER
From: Sam Marker  To: Dr. Crandall Luis A: 2022 3:18 AM EST  Subject: Sty on right eye lid    Hi,  I have a sty on my right eyelid. The prescription you gave me a year or two ago has . I am going out of town on Friday for the weekend and wondered if you could send a new prescription to [id number] on [address] .  [phone]  Thank you   France Aldrich

## 2022-03-03 DIAGNOSIS — J30.2 SEASONAL ALLERGIC RHINITIS, UNSPECIFIED TRIGGER: ICD-10-CM

## 2022-03-03 RX ORDER — MOMETASONE FUROATE 50 UG/1
SPRAY, METERED NASAL
Qty: 51 G | Refills: 3 | Status: SHIPPED | OUTPATIENT
Start: 2022-03-03

## 2022-04-27 SDOH — HEALTH STABILITY: PHYSICAL HEALTH: ON AVERAGE, HOW MANY MINUTES DO YOU ENGAGE IN EXERCISE AT THIS LEVEL?: 100 MIN

## 2022-04-27 SDOH — HEALTH STABILITY: PHYSICAL HEALTH: ON AVERAGE, HOW MANY DAYS PER WEEK DO YOU ENGAGE IN MODERATE TO STRENUOUS EXERCISE (LIKE A BRISK WALK)?: 5 DAYS

## 2022-04-27 ASSESSMENT — LIFESTYLE VARIABLES
HOW MANY STANDARD DRINKS CONTAINING ALCOHOL DO YOU HAVE ON A TYPICAL DAY: 1
HOW OFTEN DO YOU HAVE SIX OR MORE DRINKS ON ONE OCCASION: 1
HOW OFTEN DO YOU HAVE A DRINK CONTAINING ALCOHOL: 2-4 TIMES A MONTH
HOW MANY STANDARD DRINKS CONTAINING ALCOHOL DO YOU HAVE ON A TYPICAL DAY: 1 OR 2
HOW OFTEN DO YOU HAVE A DRINK CONTAINING ALCOHOL: 3

## 2022-04-27 ASSESSMENT — PATIENT HEALTH QUESTIONNAIRE - PHQ9
SUM OF ALL RESPONSES TO PHQ9 QUESTIONS 1 & 2: 0
2. FEELING DOWN, DEPRESSED OR HOPELESS: 0
SUM OF ALL RESPONSES TO PHQ QUESTIONS 1-9: 0
1. LITTLE INTEREST OR PLEASURE IN DOING THINGS: 0
SUM OF ALL RESPONSES TO PHQ QUESTIONS 1-9: 0

## 2022-05-04 ENCOUNTER — OFFICE VISIT (OUTPATIENT)
Dept: INTERNAL MEDICINE CLINIC | Age: 67
End: 2022-05-04
Payer: COMMERCIAL

## 2022-05-04 VITALS
BODY MASS INDEX: 20.2 KG/M2 | HEIGHT: 63 IN | SYSTOLIC BLOOD PRESSURE: 90 MMHG | DIASTOLIC BLOOD PRESSURE: 70 MMHG | HEART RATE: 60 BPM | WEIGHT: 114 LBS

## 2022-05-04 DIAGNOSIS — Q43.9: ICD-10-CM

## 2022-05-04 DIAGNOSIS — E78.00 HYPERCHOLESTEROLEMIA: ICD-10-CM

## 2022-05-04 DIAGNOSIS — M81.6 LOCALIZED OSTEOPOROSIS WITHOUT CURRENT PATHOLOGICAL FRACTURE: Primary | ICD-10-CM

## 2022-05-04 DIAGNOSIS — D72.819 LEUKOPENIA, UNSPECIFIED TYPE: ICD-10-CM

## 2022-05-04 DIAGNOSIS — Z23 NEED FOR PNEUMOCOCCAL VACCINATION: ICD-10-CM

## 2022-05-04 DIAGNOSIS — Z63.79 STRESS DUE TO ILLNESS OF FAMILY MEMBER: ICD-10-CM

## 2022-05-04 PROCEDURE — 90677 PCV20 VACCINE IM: CPT | Performed by: FAMILY MEDICINE

## 2022-05-04 PROCEDURE — 99214 OFFICE O/P EST MOD 30 MIN: CPT | Performed by: FAMILY MEDICINE

## 2022-05-04 PROCEDURE — 90471 IMMUNIZATION ADMIN: CPT | Performed by: FAMILY MEDICINE

## 2022-05-04 RX ORDER — RISEDRONATE SODIUM 35 MG/1
35 TABLET, FILM COATED ORAL
Qty: 1 TABLET | Refills: 11 | Status: SHIPPED
Start: 2022-05-04 | End: 2022-05-04 | Stop reason: CLARIF

## 2022-05-04 RX ORDER — RISEDRONATE SODIUM 150 MG/1
1 TABLET, FILM COATED ORAL
Qty: 1 TABLET | Refills: 11 | Status: SHIPPED | OUTPATIENT
Start: 2022-05-04 | End: 2022-06-08 | Stop reason: SDUPTHER

## 2022-05-04 SDOH — ECONOMIC STABILITY: FOOD INSECURITY: WITHIN THE PAST 12 MONTHS, THE FOOD YOU BOUGHT JUST DIDN'T LAST AND YOU DIDN'T HAVE MONEY TO GET MORE.: NEVER TRUE

## 2022-05-04 SDOH — ECONOMIC STABILITY: FOOD INSECURITY: WITHIN THE PAST 12 MONTHS, YOU WORRIED THAT YOUR FOOD WOULD RUN OUT BEFORE YOU GOT MONEY TO BUY MORE.: NEVER TRUE

## 2022-05-04 ASSESSMENT — SOCIAL DETERMINANTS OF HEALTH (SDOH): HOW HARD IS IT FOR YOU TO PAY FOR THE VERY BASICS LIKE FOOD, HOUSING, MEDICAL CARE, AND HEATING?: NOT HARD AT ALL

## 2022-05-04 NOTE — PROGRESS NOTES
Montse Santoyo (:  1955) is a 78 y.o. female, here for evaluation of the following chief complaint(s):  Check-Up (routine visit. Has not been seen in a while. )      HPI  Feeling fine. She did not want to have the Medicare wellness visit and instead wanted to do an exam and follow-up of her health issues. Does have osteoporosis at the spine. She did not tolerate weekly Fosamax. She had severe GI distress for 3 to 4 days each week. Her father has osteoporosis and one of her sisters, she thinks also has osteoporosis. She is the oldest.    She is also concerned about her 's memory and we discussed this issue 2. She had the appendix and cecum removed laprascopically and a precancerous polyp was at the staple line. She did have colonoscopy with Dr. Miguelito Staley in February for this reason. This is not recorded in the chart. We will track it down. It was done at [facility]. She was also told by her surgeon that she had a congenital malformation, with abnormal location of the intestine. The duodenum was not across the midline and comes straight down from the stomach and most of the small bowel is lying towards the right abdomen going into the cecum. The terminal ileum was in the pelvis with the right colon. The ascending in the mid abdomen to slightly to the left of abdomen. It was deep to the small bowel. Then it came down the left side through the sigmoid colon in a more normal anatomic position. The liver and gallbladder were in normal position and the left anatomy also in normal position. She is not aware of any issues into her has normal bowel movements and digestion. Cholesterol.   Lab Results   Component Value Date    LDLCALC 102 (H) 2019    LDLDIRECT 106 (H) 2015     The 10-year ASCVD risk score (Ward Harrell, et al., 2013) is: 3.1%    Values used to calculate the score:      Age: 78 years      Sex: Female      Is Non- : No      Diabetic: No      Tobacco smoker: No      Systolic Blood Pressure: 90 mmHg      Is BP treated: No      HDL Cholesterol: 79 mg/dL      Total Cholesterol: 198 mg/dL      ASSESSMENT/PLAN:    1. Osteoporosis  Discussed trial of a monthly bisphosphonate. If it bothers her, we can wait for a year and rescan or can consider Reclast or Prolia. Discussed that I will not make her put up with miserable GI symptoms to do this treatment. She can stop it if any problems and let me know. - Comprehensive Metabolic Panel; Future  - Vitamin D 25 Hydroxy; Future  - TSH with Reflex; Future  - Risedronate Sodium 150 MG TABS; Take 1 tablet by mouth every 30 days  Dispense: 1 tablet; Refill: 11    2. Leukopenia, unspecified type  This is very mild and intermittent. Check and monitor regularly. - CBC with Auto Differential; Future    3. Hypercholesterolemia  ASCVD risk is currently low. She has high HDL. We will continue to monitor.  - Lipid, Fasting; Future    4. Need for pneumococcal vaccination  Had Pneumo 23 at the age of 71. We will update her on newly recommended Prevnar 20.  - Pneumococcal Conjugate PCV20, PF (Prevnar 20)    5. Congenital malformation of intestine  Reviewed notes from Dr. Dona Crain and updated chart. 6. Stress due to illness of family member   has prostate cancer and autoimmune pancreatitis. He is 116 years older than she. She is noticing him having some memory issues and does not know if she should be concerned. She should discuss her concerns with his primary care doctor. I did discuss with her the [place] mental status exam as a potential screen. FOLLOW UP:  Return for schedule a virtual Medicare AW soon;  1 year for in person visit. .        OBJECTIVE:    Vitals:    05/04/22 1617   BP: 90/70   Pulse: 60   Weight: 114 lb (51.7 kg)   Height: 5' 2.5\" (1.588 m)       Physical Exam        Additional History:   Outpatient Medications Marked as Taking for the 5/4/22 encounter (Office Visit) with Kirstin Cedeno MD   Medication Sig Dispense Refill    mometasone (NASONEX) 50 MCG/ACT nasal spray USE 1 TO 2 SPRAYS IN EACH NOSTRIL DAILY. NEED TO USE REGULARLY FOR BENEFIT 51 g 3    Multiple Vitamins-Minerals (THERAPEUTIC MULTIVITAMIN-MINERALS) tablet Take 1 tablet by mouth daily      Calcium Carbonate (CALCIUM-CARB 600 PO) Take 1 tablet by mouth daily      Fexofenadine HCl (ALLERGY 24-HR PO) Take by mouth      GuaiFENesin (MUCINEX PO) Take by mouth         Review of Systems    An electronic signature was used to authenticate this note.     Emily Roman MD

## 2022-05-04 NOTE — Clinical Note
Check with [facility]. Colonoscopy at [facility] in Feb 2022. Need to track down because needs yearly.

## 2022-05-09 DIAGNOSIS — E78.00 HYPERCHOLESTEROLEMIA: ICD-10-CM

## 2022-05-09 DIAGNOSIS — D72.819 LEUKOPENIA, UNSPECIFIED TYPE: ICD-10-CM

## 2022-05-09 DIAGNOSIS — M81.6 LOCALIZED OSTEOPOROSIS WITHOUT CURRENT PATHOLOGICAL FRACTURE: ICD-10-CM

## 2022-05-09 LAB
A/G RATIO: 2.2 (ref 1.1–2.2)
ALBUMIN SERPL-MCNC: 4.2 G/DL (ref 3.4–5)
ALP BLD-CCNC: 105 U/L (ref 40–129)
ALT SERPL-CCNC: 22 U/L (ref 10–40)
ANION GAP SERPL CALCULATED.3IONS-SCNC: 14 MMOL/L (ref 3–16)
AST SERPL-CCNC: 34 U/L (ref 15–37)
BASOPHILS ABSOLUTE: 0.1 K/UL (ref 0–0.2)
BASOPHILS RELATIVE PERCENT: 2.4 %
BILIRUB SERPL-MCNC: 0.3 MG/DL (ref 0–1)
BUN BLDV-MCNC: 12 MG/DL (ref 7–20)
CALCIUM SERPL-MCNC: 8.9 MG/DL (ref 8.3–10.6)
CHLORIDE BLD-SCNC: 103 MMOL/L (ref 99–110)
CHOLESTEROL, FASTING: 209 MG/DL (ref 0–199)
CO2: 24 MMOL/L (ref 21–32)
CREAT SERPL-MCNC: 0.6 MG/DL (ref 0.6–1.2)
EOSINOPHILS ABSOLUTE: 0.1 K/UL (ref 0–0.6)
EOSINOPHILS RELATIVE PERCENT: 3.3 %
GFR AFRICAN AMERICAN: >60
GFR NON-AFRICAN AMERICAN: >60
GLUCOSE BLD-MCNC: 92 MG/DL (ref 70–99)
HCT VFR BLD CALC: 42.1 % (ref 36–48)
HDLC SERPL-MCNC: 83 MG/DL (ref 40–60)
HEMOGLOBIN: 14.2 G/DL (ref 12–16)
LDL CHOLESTEROL CALCULATED: 107 MG/DL
LYMPHOCYTES ABSOLUTE: 1.1 K/UL (ref 1–5.1)
LYMPHOCYTES RELATIVE PERCENT: 38.9 %
MCH RBC QN AUTO: 29.3 PG (ref 26–34)
MCHC RBC AUTO-ENTMCNC: 33.6 G/DL (ref 31–36)
MCV RBC AUTO: 87.1 FL (ref 80–100)
MONOCYTES ABSOLUTE: 0.3 K/UL (ref 0–1.3)
MONOCYTES RELATIVE PERCENT: 10.3 %
NEUTROPHILS ABSOLUTE: 1.3 K/UL (ref 1.7–7.7)
NEUTROPHILS RELATIVE PERCENT: 45.1 %
PDW BLD-RTO: 14.9 % (ref 12.4–15.4)
PLATELET # BLD: 209 K/UL (ref 135–450)
PMV BLD AUTO: 8.5 FL (ref 5–10.5)
POTASSIUM SERPL-SCNC: 4.1 MMOL/L (ref 3.5–5.1)
RBC # BLD: 4.84 M/UL (ref 4–5.2)
SODIUM BLD-SCNC: 141 MMOL/L (ref 136–145)
TOTAL PROTEIN: 6.1 G/DL (ref 6.4–8.2)
TRIGLYCERIDE, FASTING: 96 MG/DL (ref 0–150)
TSH REFLEX: 3.3 UIU/ML (ref 0.27–4.2)
VITAMIN D 25-HYDROXY: 51.7 NG/ML
VLDLC SERPL CALC-MCNC: 19 MG/DL
WBC # BLD: 2.9 K/UL (ref 4–11)

## 2022-05-10 NOTE — RESULT ENCOUNTER NOTE
Sent results MyChart. Your labs are overall normal, except:  Your white blood count is low  and has been low for a couple of years,but this time even lower. You have enough infection fighting cells that it is not a critical low. This result is not explainable with these basic blood tests. I suggest seeing a hematologist (blood specialist) to see if they think you should have further testing. Cholesterol is good and at goal due to high HDL and good triglycerides, so the LDL is fine.

## 2022-05-16 SDOH — HEALTH STABILITY: PHYSICAL HEALTH: ON AVERAGE, HOW MANY DAYS PER WEEK DO YOU ENGAGE IN MODERATE TO STRENUOUS EXERCISE (LIKE A BRISK WALK)?: 5 DAYS

## 2022-05-16 SDOH — HEALTH STABILITY: PHYSICAL HEALTH: ON AVERAGE, HOW MANY MINUTES DO YOU ENGAGE IN EXERCISE AT THIS LEVEL?: 90 MIN

## 2022-05-16 ASSESSMENT — PATIENT HEALTH QUESTIONNAIRE - PHQ9
SUM OF ALL RESPONSES TO PHQ9 QUESTIONS 1 & 2: 0
SUM OF ALL RESPONSES TO PHQ QUESTIONS 1-9: 0
1. LITTLE INTEREST OR PLEASURE IN DOING THINGS: 0
SUM OF ALL RESPONSES TO PHQ QUESTIONS 1-9: 0
2. FEELING DOWN, DEPRESSED OR HOPELESS: 0

## 2022-05-16 ASSESSMENT — LIFESTYLE VARIABLES
HOW OFTEN DO YOU HAVE A DRINK CONTAINING ALCOHOL: MONTHLY OR LESS
HOW OFTEN DO YOU HAVE SIX OR MORE DRINKS ON ONE OCCASION: 1
HOW MANY STANDARD DRINKS CONTAINING ALCOHOL DO YOU HAVE ON A TYPICAL DAY: 1
HOW OFTEN DO YOU HAVE A DRINK CONTAINING ALCOHOL: 2
HOW MANY STANDARD DRINKS CONTAINING ALCOHOL DO YOU HAVE ON A TYPICAL DAY: 1 OR 2

## 2022-05-24 ENCOUNTER — TELEMEDICINE (OUTPATIENT)
Dept: INTERNAL MEDICINE CLINIC | Age: 67
End: 2022-05-24
Payer: COMMERCIAL

## 2022-05-24 DIAGNOSIS — Z00.00 MEDICARE ANNUAL WELLNESS VISIT, SUBSEQUENT: Primary | ICD-10-CM

## 2022-05-24 DIAGNOSIS — Z71.89 ACP (ADVANCE CARE PLANNING): ICD-10-CM

## 2022-05-24 PROCEDURE — G0439 PPPS, SUBSEQ VISIT: HCPCS | Performed by: FAMILY MEDICINE

## 2022-05-24 ASSESSMENT — LIFESTYLE VARIABLES: HOW OFTEN DO YOU HAVE A DRINK CONTAINING ALCOHOL: MONTHLY OR LESS

## 2022-05-24 NOTE — PROGRESS NOTES
concerns:  patient declines any further evaluation/treatment for hearing issues  · Vision concerns:  patient encouraged to make appointment with his/her eye specialist.  Aging eye changes. Beginning of cataract. Safety:  Do you have working smoke detectors?: Yes  Do you have any tripping hazards - loose or unsecured carpets or rugs?: No  Do you have any tripping hazards - clutter in doorways, halls, or stairs?: No  Do you have either shower bars, grab bars, non-slip mats or non-slip surfaces in your shower or bathtub?: (!) No  Do all of your stairways have a railing or banister?: Yes  Do you always fasten your seatbelt when you are in a car?: Yes    Safety Interventions:  · Home safety tips provided  · step in bath / shower combo           Objective      Patient-Reported Vitals  No data recorded     . Physical Exam  Constitutional:       Appearance: Normal appearance. Eyes:      General: No scleral icterus. Pulmonary:      Effort: Pulmonary effort is normal. No respiratory distress. Skin:     Coloration: Skin is not pale. Neurological:      General: No focal deficit present. Mental Status: She is alert and oriented to person, place, and time. Psychiatric:         Mood and Affect: Mood normal.         Behavior: Behavior normal.              Allergies   Allergen Reactions    Ibuprofen Other (See Comments)     GI upset     Prior to Visit Medications    Medication Sig Taking? Authorizing Provider   mometasone (NASONEX) 50 MCG/ACT nasal spray USE 1 TO 2 SPRAYS IN EACH NOSTRIL DAILY.  NEED TO USE REGULARLY FOR BENEFIT Yes Jannet Joshua MD   Multiple Vitamins-Minerals (THERAPEUTIC MULTIVITAMIN-MINERALS) tablet Take 1 tablet by mouth daily Yes Historical Provider, MD   Calcium Carbonate (CALCIUM-CARB 600 PO) Take 1 tablet by mouth daily Yes Historical Provider, MD   albuterol sulfate  (90 Base) MCG/ACT inhaler Inhale 2 puffs into the lungs every 4 hours as needed for Wheezing or Shortness of Breath Yes Miky Lovell MD   Fexofenadine HCl (ALLERGY 24-HR PO) Take by mouth Yes Historical Provider, MD   GuaiFENesin (MUCINEX PO) Take by mouth Yes Historical Provider, MD   Risedronate Sodium 150 MG TABS Take 1 tablet by mouth every 30 days  Miky Lovell MD       CareTeam (Including outside providers/suppliers regularly involved in providing care):   Patient Care Team:  Miky Lovell MD as PCP - General (Family Medicine)  Miky Lovell MD as PCP - [facility]  Erin Beck MD as Surgeon (General Surgery)     Reviewed and updated this visit:  Allergies  Meds            Peg Hayes, was evaluated through a synchronous (real-time) audio-video encounter. The patient (or guardian if applicable) is aware that this is a billable service, which includes applicable co-pays. This Virtual Visit was conducted with patient's (and/or legal guardian's) consent. The visit was conducted pursuant to the emergency declaration under the [address] [place] [address] waiver authority and the [employer] General Act. Patient identification was verified, and a caregiver was present when appropriate. The patient was located at home in a state where the provider was licensed to provide care.

## 2022-05-24 NOTE — PATIENT INSTRUCTIONS
Personalized Preventive Plan for Johnny Yarbrough - 5/24/2022  Medicare offers a range of preventive health benefits. Some of the tests and screenings are paid in full while other may be subject to a deductible, co-insurance, and/or copay. Some of these benefits include a comprehensive review of your medical history including lifestyle, illnesses that may run in your family, and various assessments and screenings as appropriate. After reviewing your medical record and screening and assessments performed today your provider may have ordered immunizations, labs, imaging, and/or referrals for you. A list of these orders (if applicable) as well as your Preventive Care list are included within your After Visit Summary for your review. Other Preventive Recommendations:    · A preventive eye exam performed by an eye specialist is recommended every 1-2 years to screen for glaucoma; cataracts, macular degeneration, and other eye disorders. · A preventive dental visit is recommended every 6 months. · Try to get at least 150 minutes of exercise per week or 10,000 steps per day on a pedometer . · Order or download the FREE \"Exercise & Physical Activity: Your Everyday Guide\" from [employer]. Call 7-615.635.6488 or search [employer] online. · You need 6374-4209 mg of calcium and 7927-1773 IU of vitamin D per day. It is possible to meet your calcium requirement with diet alone, but a vitamin D supplement is usually necessary to meet this goal.  · When exposed to the sun, use a sunscreen that protects against both UVA and UVB radiation with an SPF of 30 or greater. Reapply every 2 to 3 hours or after sweating, drying off with a towel, or swimming. · Always wear a seat belt when traveling in a car. Always wear a helmet when riding a bicycle or motorcycle.

## 2022-06-08 ENCOUNTER — PATIENT MESSAGE (OUTPATIENT)
Dept: INTERNAL MEDICINE CLINIC | Age: 67
End: 2022-06-08

## 2022-06-08 DIAGNOSIS — M81.6 LOCALIZED OSTEOPOROSIS WITHOUT CURRENT PATHOLOGICAL FRACTURE: ICD-10-CM

## 2022-06-08 RX ORDER — RISEDRONATE SODIUM 150 MG/1
1 TABLET, FILM COATED ORAL
Qty: 3 TABLET | Refills: 3 | Status: SHIPPED | OUTPATIENT
Start: 2022-06-08

## 2022-08-02 ENCOUNTER — PATIENT MESSAGE (OUTPATIENT)
Dept: INTERNAL MEDICINE CLINIC | Age: 67
End: 2022-08-02

## 2022-08-02 DIAGNOSIS — R74.01 TRANSAMINITIS: Primary | ICD-10-CM

## 2022-08-02 NOTE — TELEPHONE ENCOUNTER
Bharathi Morris MA 8/2/2022 12:04 PM EDT      ----- Message -----  From: Carlton Dahl  Sent: 8/2/2022 10:56 AM EDT  To: , *  Subject: Report from Dr. Mandy Escobar     I was wondering if you received any notes from Dr. Mandy Escobar? I saw her June 6 and June 20. She said she would be sending you my results etc. Basically my R Direita-Igreja 21 counts went up to 4.6 . Some of the others didnt really change but she isnt too concerned . As long as I am feeling well and have no symptoms I dont see her until next June. But she said my liver numbers went up and recommended that I get those redone in 3 months ,which I believe would be in September . I think I get those redone through you . Let me know if you heard from her office and if I do need the liver blood work retested . Thank you .   Neo Torre

## 2022-08-30 DIAGNOSIS — J45.20 ASTHMA, EXTRINSIC, MILD INTERMITTENT, UNCOMPLICATED: ICD-10-CM

## 2022-08-31 RX ORDER — ALBUTEROL SULFATE 90 UG/1
2 AEROSOL, METERED RESPIRATORY (INHALATION) EVERY 4 HOURS PRN
Qty: 3 EACH | Refills: 1 | Status: SHIPPED | OUTPATIENT
Start: 2022-08-31

## 2022-09-16 ENCOUNTER — HOSPITAL ENCOUNTER (OUTPATIENT)
Age: 67
Discharge: HOME | End: 2022-09-16
Payer: COMMERCIAL

## 2022-09-16 DIAGNOSIS — R74.01 TRANSAMINITIS: ICD-10-CM

## 2022-09-16 LAB
ALBUMIN SERPL-MCNC: 4.1 G/DL (ref 3.4–5)
ALP BLD-CCNC: 144 U/L (ref 40–129)
ALT SERPL-CCNC: 114 U/L (ref 10–40)
AST SERPL-CCNC: 149 U/L (ref 15–37)
BILIRUB SERPL-MCNC: 0.3 MG/DL (ref 0–1)
BILIRUBIN DIRECT: <0.2 MG/DL (ref 0–0.3)
BILIRUBIN, INDIRECT: ABNORMAL MG/DL (ref 0–1)
HEPATITIS B SURFACE ANTIGEN INTERPRETATION: NORMAL
HEPATITIS C ANTIBODY INTERPRETATION: NORMAL
TOTAL PROTEIN: 6.4 G/DL (ref 6.4–8.2)

## 2022-09-16 PROCEDURE — 87340 HEPATITIS B SURFACE AG IA: CPT

## 2022-09-16 PROCEDURE — 86803 HEPATITIS C AB TEST: CPT

## 2022-09-16 PROCEDURE — 80076 HEPATIC FUNCTION PANEL: CPT

## 2022-09-16 PROCEDURE — 36415 COLL VENOUS BLD VENIPUNCTURE: CPT

## 2022-09-18 DIAGNOSIS — R74.01 ELEVATED LIVER TRANSAMINASE LEVEL: Primary | ICD-10-CM

## 2022-09-18 NOTE — PROGRESS NOTES
Send referral to Dr. Da Lainez and also send our last CBC's x 3 and last liver = hepatic tests x 3. Not sure if you can get into care everywhere and print off the Continuity of Care document to send also. If not let me know because I think I may be able to do this.

## 2022-09-28 ENCOUNTER — HOSPITAL ENCOUNTER (OUTPATIENT)
Age: 67
Discharge: HOME | End: 2022-09-28
Payer: COMMERCIAL

## 2022-09-28 LAB
ALBUMIN SERPL-MCNC: 4.8 G/DL (ref 3.4–5)
ALP BLD-CCNC: 113 U/L (ref 40–129)
ALT SERPL-CCNC: 35 U/L (ref 10–40)
AST SERPL-CCNC: 49 U/L (ref 15–37)
BILIRUB SERPL-MCNC: 0.3 MG/DL (ref 0–1)
BILIRUBIN DIRECT: <0.2 MG/DL (ref 0–0.3)
BILIRUBIN, INDIRECT: ABNORMAL MG/DL (ref 0–1)
FERRITIN: 44.1 NG/ML (ref 15–150)
GAMMA GLUTAMYL TRANSFERASE: 51 U/L (ref 5–36)
IRON SATURATION: 42 % (ref 15–50)
IRON: 175 UG/DL (ref 37–145)
TOTAL IRON BINDING CAPACITY: 421 UG/DL (ref 260–445)
TOTAL PROTEIN: 7.5 G/DL (ref 6.4–8.2)

## 2022-09-28 PROCEDURE — 80076 HEPATIC FUNCTION PANEL: CPT

## 2022-09-28 PROCEDURE — 82977 ASSAY OF GGT: CPT

## 2022-09-28 PROCEDURE — 86708 HEPATITIS A ANTIBODY: CPT

## 2022-09-28 PROCEDURE — 86376 MICROSOMAL ANTIBODY EACH: CPT

## 2022-09-28 PROCEDURE — 86015 ACTIN ANTIBODY EACH: CPT

## 2022-09-28 PROCEDURE — 83540 ASSAY OF IRON: CPT

## 2022-09-28 PROCEDURE — 86038 ANTINUCLEAR ANTIBODIES: CPT

## 2022-09-28 PROCEDURE — 82390 ASSAY OF CERULOPLASMIN: CPT

## 2022-09-28 PROCEDURE — 83550 IRON BINDING TEST: CPT

## 2022-09-28 PROCEDURE — 82728 ASSAY OF FERRITIN: CPT

## 2022-09-28 PROCEDURE — 36415 COLL VENOUS BLD VENIPUNCTURE: CPT

## 2022-09-28 PROCEDURE — 83516 IMMUNOASSAY NONANTIBODY: CPT

## 2022-09-29 ENCOUNTER — HOSPITAL ENCOUNTER (OUTPATIENT)
Dept: ULTRASOUND IMAGING | Age: 67
Discharge: HOME | End: 2022-09-29
Payer: MEDICARE

## 2022-09-29 DIAGNOSIS — R79.89 HYPOURICEMIA: ICD-10-CM

## 2022-09-29 LAB
ANTI-NUCLEAR ANTIBODY (ANA): NEGATIVE
TISSUE TRANSGLUTAMINASE IGA: <0.5 U/ML (ref 0–14)

## 2022-09-29 PROCEDURE — 76705 ECHO EXAM OF ABDOMEN: CPT

## 2022-09-30 LAB — HAV AB SERPL IA-ACNC: NEGATIVE

## 2022-10-01 LAB
CERULOPLASMIN: 24 MG/DL (ref 16–45)
F-ACTIN AB IGG: 5 UNITS (ref 0–19)

## 2022-10-05 LAB
LIVER-KIDNEY MICROSOME-1 AB IGG: 1 U (ref 0–24.9)
MITOCHONDRIAL M2 AB, IGG: 0.9 U/ML (ref 0–4)

## 2022-10-31 ENCOUNTER — HOSPITAL ENCOUNTER (OUTPATIENT)
Dept: CT IMAGING | Age: 67
Discharge: HOME | End: 2022-10-31
Payer: MEDICARE

## 2022-10-31 DIAGNOSIS — N28.9 KIDNEY DISEASE: ICD-10-CM

## 2022-10-31 LAB
BUN BLDV-MCNC: 13 MG/DL (ref 7–20)
CREAT SERPL-MCNC: 0.7 MG/DL (ref 0.6–1.2)
GFR SERPL CREATININE-BSD FRML MDRD: >60 ML/MIN/{1.73_M2}

## 2022-10-31 PROCEDURE — 36415 COLL VENOUS BLD VENIPUNCTURE: CPT

## 2022-10-31 PROCEDURE — 84520 ASSAY OF UREA NITROGEN: CPT

## 2022-10-31 PROCEDURE — 82565 ASSAY OF CREATININE: CPT

## 2022-10-31 PROCEDURE — 6360000004 HC RX CONTRAST MEDICATION: Performed by: INTERNAL MEDICINE

## 2022-10-31 PROCEDURE — 74170 CT ABD WO CNTRST FLWD CNTRST: CPT

## 2022-10-31 RX ADMIN — IOPAMIDOL 75 ML: 755 INJECTION, SOLUTION INTRAVENOUS at 08:00

## 2022-11-11 ENCOUNTER — HOSPITAL ENCOUNTER (OUTPATIENT)
Age: 67
Discharge: HOME | End: 2022-11-11
Payer: MEDICARE

## 2022-11-11 LAB
ALBUMIN SERPL-MCNC: 4.3 G/DL (ref 3.4–5)
ALP BLD-CCNC: 92 U/L (ref 40–129)
ALT SERPL-CCNC: 28 U/L (ref 10–40)
AST SERPL-CCNC: 37 U/L (ref 15–37)
BILIRUB SERPL-MCNC: 0.4 MG/DL (ref 0–1)
BILIRUBIN DIRECT: <0.2 MG/DL (ref 0–0.3)
BILIRUBIN, INDIRECT: NORMAL MG/DL (ref 0–1)
TOTAL PROTEIN: 6.4 G/DL (ref 6.4–8.2)

## 2022-11-11 PROCEDURE — 80076 HEPATIC FUNCTION PANEL: CPT

## 2022-11-11 PROCEDURE — 36415 COLL VENOUS BLD VENIPUNCTURE: CPT

## 2022-11-17 ENCOUNTER — HOSPITAL ENCOUNTER (OUTPATIENT)
Dept: WOMENS IMAGING | Age: 67
Discharge: HOME | End: 2022-11-17
Payer: MEDICARE

## 2022-11-17 VITALS — BODY MASS INDEX: 19.84 KG/M2 | WEIGHT: 112 LBS | HEIGHT: 63 IN

## 2022-11-17 DIAGNOSIS — R92.8 ABNORMAL MAMMOGRAM: ICD-10-CM

## 2022-11-17 PROCEDURE — 77067 SCR MAMMO BI INCL CAD: CPT

## 2023-06-15 ENCOUNTER — TELEPHONE (OUTPATIENT)
Dept: SURGERY | Age: 68
End: 2023-06-15

## 2023-06-19 NOTE — TELEPHONE ENCOUNTER
PT returned my call and I advised the info from Dr. Burgess Aldrich that she felt all questions were answered and the consult was no longer needed. Per PT this is correct, but she received the confirmation for the appt and felt that she was suppose to return. Appt is cancelled for christine as far as PT is concerned. She will see us on her surgical date on 7/24/2023.  Bjj

## 2023-06-19 NOTE — TELEPHONE ENCOUNTER
Called PT - N/A L/M to call back per request from Dr. Bobby Hightower. Per Dr. Bobby Hightower she talked w/PT on phone and thought that the consult was no longer needed.   Please confirm this w/PT or if PT has add questions/concerns, and if she still wants to come in for the consult scheduled for 6/20/2023 @ 1:45p.bjj

## 2023-06-28 PROBLEM — C43.30 MALIGNANT MELANOMA OF FACE EXCLUDING EYELID, NOSE, LIP, AND EAR (HCC): Status: ACTIVE | Noted: 2023-06-28

## 2023-07-24 ENCOUNTER — PROCEDURE VISIT (OUTPATIENT)
Dept: SURGERY | Age: 68
End: 2023-07-24
Payer: MEDICARE

## 2023-07-24 DIAGNOSIS — C43.39 MALIGNANT MELANOMA OF CHEEK (HCC): Primary | ICD-10-CM

## 2023-07-24 PROCEDURE — 11642 EXC F/E/E/N/L MAL+MRG 1.1-2: CPT | Performed by: DERMATOLOGY

## 2023-07-24 NOTE — PATIENT INSTRUCTIONS
[facility] Office Hours:    Monday-Thursday  7:30 AM-4:30 PM    Friday  9:00 AM-1:00 PM      POST-OPERATIVE CARE CARING FOR YOUR SURGICAL SITE  The bandage should remain on and completely dry until your  next appt. Do NOT get the bandage wet. If the dressing comes off or if you have questions, or concerns about the dressing, please call the office for instructions! Activity: Do not lift anything heavier than a gallon of milk for 1 week. Also, avoid strenuous activity such as running, power walking or contact sports. Eating and drinking: Do not drink alcohol for 48 hours after your procedure. Alcohol increases the chances of bleeding. Medicines   -If you have discomfort, take Acetaminophen (Tylenol or Extra Strength Tylenol). Follow the instructions and warning on the bottle. -If your doctor has prescribed you an Aspirin daily, please keep taking it. Do not take extra Aspirin or medicines containing Aspirin (such as [place] and Excedrin) for 48 hours after your procedure. Bleeding: If bleeding occurs, DO NOT remove the bandage. Put firm pressure on the area with gauze for 20 minutes without peeking. If the bleeding continues, apply pressure for another 20 minutes. If the bleeding does not stop after you apply pressure, call us right away. If you can not call, go to the nearest emergency room or urgent care facility. What to expect:  You may have these symptoms. They are normal and should get better with time:  Swelling. Swelling usually increases for the first 48 hours after your procedure and then begins to improve. Some soreness and redness around your wound. If we worked close to your eyes (forehead, nose, temple, or upper cheeks) your eyes may become swollen and/ or black and blue. Bruising, which could last 1 week or more.     Call us at [phone] right away if you have any of the following symptoms:  -Bleeding that you can not stop (see highlighted area

## 2023-07-24 NOTE — PROGRESS NOTES
MALIGNANT MELANOMA STAGED SERIAL EXCISION NOTE     SURGEON: Julius Dinh. Pa Saucedo MD    ASSISTANT:  Da Archer RN    REFERRING PHYSICIAN:  Elina Mills MD    PREOPERATIVE DIAGNOSIS: MELANOMA, 0.4MM, NO ULCERATION, NO MITOSES     POSTOPERATIVE DIAGNOSIS: SAME. OPERATIVE PROCEDURE: STAGED SERIAL EXCISION (SLOW MOHS) FOR CURATIVE INTENT    RECONSTRUCTION OF DEFECT: DEFERRED UNTIL CONFIRMATION OF CLEARANCE OF TUMOR     LOCATION: Right malar infraorbital area     SIZE OF LESION: 10x5 MM     SIZE OF LESION PLUS CIRCUMFERENTIAL MARGIN OF STAGE I: 19x15 MM     ANESTHESIA:3 CC XYLOCAINE 1% WITH EPINEPHRINE 1:100,000, BUFFERED. DURATION OF PROCEDURE: 30 MINUTES     POSTOPERATIVE OBSERVATION: 30 MINUTES     EBL: MINIMAL. SPECIMENS: 5    COMPLICATIONS: NONE     DESCRIPTION OF PROCEDURE: The patient was given a mirror and the biopsy site was identified, marked with surgical marking pen, and verified with the patient. Written consent was obtained. There was a time out for person and procedure verification. The operative site was cleansed with Hibiclens, then cleaned off, dried, and draped sterilely. The lesion was excised in a disc design down to subcutaneous fat with 1 mm margins, placed in formalin and labelled \"A\" and sent to pathology for examination of tumor depth/clearance. Hemostasis was achieved with electrosurgery. Another 4 mm rim of tissue was taken circumferentially at the peripheral margin of the clinically apparent pigmented lesion. This was divided into 4 sections, labelled B- corresponding to section 12 o'clock to 3 o'clock, labelled C corresponding to section 3 o'clock to 6 o'clock, labelled D corresponding to 6 o'clock to 9 o'clock and E corresponding to 9 o'clock to 12 o'clock. The inner margins were inked green and sutures were placed at 12, 3, 6 and 9 o'clock for orientation. These specimens were individually placed in formalin specimen jars.      All specimens were delivered to the pathology

## 2023-07-25 ENCOUNTER — TELEPHONE (OUTPATIENT)
Dept: SURGERY | Age: 68
End: 2023-07-25

## 2023-07-25 LAB — DERMATOLOGY PATHOLOGY REPORT: ABNORMAL

## 2023-07-25 NOTE — TELEPHONE ENCOUNTER
The patient was in the office on 7/24/2023 for Staged excision  located on the RT malar infraorbital area with Staged/Slow Mohs repair. The patient tolerated the procedure well and left the office in good condition. Pain level on post-operative day 1:  none and no Tylenol has been needed as yet. Any bleeding episode that required pressure to be held, bandage change or a call to the office or MD?  no     Any other issues?:  no    A post-operative telephone call was placed at 10:28a, 7/25/2023, in order to check on the patient's recovery process. The patient reported doing well and had no complaints other than those listed above, if any. All of the patient's questions were answered. Advised to call w/any questions/concerns.

## 2023-07-26 ENCOUNTER — PROCEDURE VISIT (OUTPATIENT)
Dept: SURGERY | Age: 68
End: 2023-07-26
Payer: MEDICARE

## 2023-07-26 VITALS — HEART RATE: 73 BPM | SYSTOLIC BLOOD PRESSURE: 134 MMHG | DIASTOLIC BLOOD PRESSURE: 80 MMHG

## 2023-07-26 DIAGNOSIS — C43.39 MALIGNANT MELANOMA OF CHEEK (HCC): Primary | ICD-10-CM

## 2023-07-26 PROCEDURE — 14301 TIS TRNFR ANY 30.1-60 SQ CM: CPT | Performed by: DERMATOLOGY

## 2023-07-26 RX ORDER — OXYCODONE HYDROCHLORIDE 5 MG/1
5 TABLET ORAL EVERY 6 HOURS PRN
Qty: 12 TABLET | Refills: 0 | Status: SHIPPED | OUTPATIENT
Start: 2023-07-26 | End: 2023-07-29

## 2023-07-26 NOTE — PATIENT INSTRUCTIONS
[facility] Office Hours:    Monday-Thursday  7:30 AM-4:30 PM    Friday  9:00 AM-1:00 PM        POST-OPERATIVE CARE FOR STITCHES  Bandage change after 48 hours    CARING FOR YOUR SURGICAL SITE  The bandage should remain on and completely dry for 48 hours. Do NOT get the bandage wet. After the first 48 hours, gently remove the remaining part of the bandage. It can be helpful to moisten the bandage edges in the shower. Steri strips may still be on the wound. It is ok, they will fall off slowly with the daily bandage changes. Gently clean the wound daily with mild soap and water. Try to clean off crust and debris. Dry (pat) the area with a clean Q-tip or gauze. Apply a layer of Vaseline/ Aquaphor (or Bacitracin if your doctor recommends) to the wound area only. Cut a piece of Telfa (or any non-stick dressing) to fit just over the wound and secure it with paper tape. If the wound is small you may use a Band- Aid. Keep area covered for a total of 1 week(s). If the dressing comes off or if you have questions, or concerns about the dressing, please call the office for instructions! POST OPERATIVE INSTRUCTIONS    Activity: Do not lift anything heavier than a gallon of milk for 1 week. Also, avoid strenuous activity such as running, power walking or contact sports. Eating and drinking: Do not drink alcohol for 48 hours after your procedure. Alcohol increases the chances of bleeding. Medicines   -If you have discomfort, take Acetaminophen (Tylenol or Extra Strength Tylenol). Follow the instructions and warning on the bottle. -If your doctor has prescribed you an Aspirin daily, please keep taking it. Do not take extra Aspirin or medicines containing Aspirin (such as [place] and Excedrin) for 48 hours after your procedure. Bleeding: If bleeding occurs, DO NOT remove the bandage. Put firm pressure on the area with gauze for 20 minutes without peeking.  If the bleeding continues, apply

## 2023-07-26 NOTE — PROGRESS NOTES
DELAYED REPAIR INTRAOPERATIVE PROCEDURE NOTE    SURGEON:  Kylah Arauz MD    ASSISTANT: Nathaniel Valderrama RN    REFERRING PHYSICIAN:  France Mae MD     PRE-OPERATIVE DIAGNOSIS:  Defect resulting from excision of a malignant melanoma, 0.4 mm    POST-OPERATIVE DIAGNOSIS: SAME. OPERATIVE PROCEDURE:  DELAYED REPAIR    RECONSTRUCTION OF DEFECT: Rotation Flap    LOCATION: Right malar infraorbital area    SIZE OF DEFECT:19x15 MM    FINAL WOUND LENGTH: 35.61cm2    ANESTHESIA:  10 cc 1% lidocaine with epinephrine 1:100,000 buffered      DURATION OF PROCEDURE: 60 MINUTES    POSTOPERATIVE OBSERVATION: 30 MINUTES    EBL: MINIMAL. SPECIMENS:  0    COMPLICATIONS: None    DESCRIPTION OF PROCEDURE:   REPAIR:  Various closure modalities were discussed with the patient, and it was decided that a Rotation Flap would best preserve normal anatomic and functional relationships. Additional risks of flap necrosis, irregular scar line and wound dehiscence were discussed. The patient was placed on the procedure room table. The area was anesthetized with 1% lidocaine with epinephrine 1:100,000 buffered, was given a sterile prep using Betadine  and draped in the usual sterile fashion. Incisions were made in the appropriate fashion for the flap designed. The wound was extensively undermined and meticulous hemostasis were obtained using spot monopolar electrocoagulation. The flap was elevated and rotated/carried over into the primary defect. Subcutaneous dead space and dermis were closed using  4-0   Mersiline sutures to tack the advancing edge of the flap to the infraorbital periosteum, 5-0 Vicryl and 6-0 Vicryl for subcutaneous approximation  and the epidermis was approximated using 6-0 Prolene running epidermal sutures . The area was cleansed with sterile saline. Petrolatum ointment was applied to the wound and a pressure dressing was applied.   Detailed post-care instructions were verbally reviewed with the patient and a handout

## 2023-07-27 ENCOUNTER — TELEPHONE (OUTPATIENT)
Dept: SURGERY | Age: 68
End: 2023-07-27

## 2023-07-27 NOTE — TELEPHONE ENCOUNTER
The patient was in the office on 7/26/23 for Repair of staged excision located on the R malar infraorbital area with rotation flap repair. The patient tolerated the procedure well and left the office in good condition. Pain level on post-operative day 1:  present - adequately treated alternating Tylenol with the prescribed pain medication. Any bleeding episode that required pressure to be held, bandage change or a call to the office or MD?  no     Any other issues?:  no    A post-operative telephone call was placed at 3:30 p.m. in order to check on the patient's recovery process. The patient reported doing well and had no complaints other than those listed above, if any. All of the patient's questions were answered.

## 2023-08-02 ENCOUNTER — PROCEDURE VISIT (OUTPATIENT)
Dept: SURGERY | Age: 68
End: 2023-08-02

## 2023-08-02 DIAGNOSIS — Z48.02 VISIT FOR SUTURE REMOVAL: Primary | ICD-10-CM

## 2023-08-02 PROCEDURE — 99024 POSTOP FOLLOW-UP VISIT: CPT | Performed by: DERMATOLOGY

## 2023-08-02 NOTE — PROGRESS NOTES
S:  The patient is here for suture removal s/p delayed repair following staged excision on the right malar infraorbital area and Rotation Flap repair, 1 week ago. The site appears well-healed without signs of infection (redness, pain or discharge). The sutures were removed. Looks great. Normal lower lid position. Wound care and activity instructions given. The patient was scheduled for follow-up in one month for scar/wound check. The patient was scheduled for f/u with General Dermatology per their instructions. Mindy Goldman RN, am scribing for and in the presence of Dr. Michael Grigsby. Electronically signed by Katarina Reyes RN on 8/2/2023 at 2:40 PM     I, Kristan Rees MD,  personally performed the services described in this documentation as scribed by Katarina Reyes RN  in my presence, and it is both accurate and complete.       Electronically signed by Debra Feldman MD on 8/2/2023 at 4:05 PM

## 2023-09-05 ENCOUNTER — OFFICE VISIT (OUTPATIENT)
Dept: SURGERY | Age: 68
End: 2023-09-05

## 2023-09-05 DIAGNOSIS — L90.5 SCAR: Primary | ICD-10-CM

## 2023-09-05 PROCEDURE — 99024 POSTOP FOLLOW-UP VISIT: CPT | Performed by: DERMATOLOGY

## 2023-09-05 NOTE — PROGRESS NOTES
S: The patient is here for scar check s/p staged excision on the right infraorbital with Rotation Flap repair, 5 weeks ago. The patient c/o some firmness and elevation to scar in various areas but no pain . O: The scar is well-approximated and shows no signs of abnormal healing (expected amount of erythema, fullness and coloration), looks great. Normal lower lid position and scar lines becoming difficult to see  A/P:continue massage and can start silicone scar gel. Patient questions answered and expectations reviewed. The patient is scheduled for f/u scar check in 8 weeks and skin examination with General Dermatology per their recommendations.

## 2023-11-07 ENCOUNTER — OFFICE VISIT (OUTPATIENT)
Dept: SURGERY | Age: 68
End: 2023-11-07

## 2023-11-07 DIAGNOSIS — L90.5 SCAR: Primary | ICD-10-CM

## 2023-11-07 PROCEDURE — 99024 POSTOP FOLLOW-UP VISIT: CPT | Performed by: DERMATOLOGY

## 2023-11-07 SDOH — HEALTH STABILITY: PHYSICAL HEALTH: ON AVERAGE, HOW MANY MINUTES DO YOU ENGAGE IN EXERCISE AT THIS LEVEL?: 90 MIN

## 2023-11-07 SDOH — HEALTH STABILITY: PHYSICAL HEALTH: ON AVERAGE, HOW MANY DAYS PER WEEK DO YOU ENGAGE IN MODERATE TO STRENUOUS EXERCISE (LIKE A BRISK WALK)?: 5 DAYS

## 2023-11-07 ASSESSMENT — LIFESTYLE VARIABLES
HOW OFTEN DO YOU HAVE A DRINK CONTAINING ALCOHOL: MONTHLY OR LESS
HOW MANY STANDARD DRINKS CONTAINING ALCOHOL DO YOU HAVE ON A TYPICAL DAY: 1 OR 2
HOW MANY STANDARD DRINKS CONTAINING ALCOHOL DO YOU HAVE ON A TYPICAL DAY: 1
HOW OFTEN DO YOU HAVE A DRINK CONTAINING ALCOHOL: 2
HOW OFTEN DO YOU HAVE SIX OR MORE DRINKS ON ONE OCCASION: 1

## 2023-11-07 ASSESSMENT — PATIENT HEALTH QUESTIONNAIRE - PHQ9
SUM OF ALL RESPONSES TO PHQ QUESTIONS 1-9: 1
SUM OF ALL RESPONSES TO PHQ QUESTIONS 1-9: 1
SUM OF ALL RESPONSES TO PHQ9 QUESTIONS 1 & 2: 1
2. FEELING DOWN, DEPRESSED OR HOPELESS: 1
1. LITTLE INTEREST OR PLEASURE IN DOING THINGS: 0
SUM OF ALL RESPONSES TO PHQ QUESTIONS 1-9: 1
SUM OF ALL RESPONSES TO PHQ QUESTIONS 1-9: 1

## 2023-11-07 NOTE — PROGRESS NOTES
S: The patient is here for scar check s/p slow mohs on the right malar infraorbital area with Rotation Flap repair, 3.5 months ago. The patient complains there is some firmness and numbness. O: The scar is well-approximated and shows no signs of abnormal healing (expected amount of erythema, fullness and coloration), looks great. A/P:scar with great cosmesis and no change to lower lid position. No s/sx of recurrence. Patient questions answered and expectations reviewed. The patient is scheduled for f/u scar check prn and skin examination with General Dermatology per their recommendations. Electronically signed by Faby Sorto RN on 11/7/2023 at 2:25 PM    I, Faby Sorto RN, am scribing for and in the presence of Dr.Emily Grigsby,11/7/23. Ignacio Gasca MD,  personally performed the services described in this documentation as scribed by Faby Sorto RN  in my presence, and it is both accurate and complete.      Electronically signed by Silvestre Grimes MD on 11/8/2023 at 1:32 PM

## 2023-11-12 SDOH — ECONOMIC STABILITY: FOOD INSECURITY: WITHIN THE PAST 12 MONTHS, YOU WORRIED THAT YOUR FOOD WOULD RUN OUT BEFORE YOU GOT MONEY TO BUY MORE.: NEVER TRUE

## 2023-11-12 SDOH — ECONOMIC STABILITY: HOUSING INSECURITY
IN THE LAST 12 MONTHS, WAS THERE A TIME WHEN YOU DID NOT HAVE A STEADY PLACE TO SLEEP OR SLEPT IN A SHELTER (INCLUDING NOW)?: NO

## 2023-11-12 SDOH — ECONOMIC STABILITY: FOOD INSECURITY: WITHIN THE PAST 12 MONTHS, THE FOOD YOU BOUGHT JUST DIDN'T LAST AND YOU DIDN'T HAVE MONEY TO GET MORE.: NEVER TRUE

## 2023-11-12 SDOH — ECONOMIC STABILITY: INCOME INSECURITY: HOW HARD IS IT FOR YOU TO PAY FOR THE VERY BASICS LIKE FOOD, HOUSING, MEDICAL CARE, AND HEATING?: NOT HARD AT ALL

## 2023-11-15 ENCOUNTER — OFFICE VISIT (OUTPATIENT)
Dept: INTERNAL MEDICINE CLINIC | Age: 68
End: 2023-11-15

## 2023-11-15 VITALS
OXYGEN SATURATION: 99 % | BODY MASS INDEX: 20.7 KG/M2 | DIASTOLIC BLOOD PRESSURE: 72 MMHG | HEIGHT: 63 IN | WEIGHT: 116.8 LBS | HEART RATE: 70 BPM | SYSTOLIC BLOOD PRESSURE: 132 MMHG

## 2023-11-15 DIAGNOSIS — M81.6 LOCALIZED OSTEOPOROSIS WITHOUT CURRENT PATHOLOGICAL FRACTURE: Primary | ICD-10-CM

## 2023-11-15 DIAGNOSIS — Z12.31 ENCOUNTER FOR SCREENING MAMMOGRAM FOR BREAST CANCER: ICD-10-CM

## 2023-11-15 DIAGNOSIS — D72.819 LEUKOPENIA, UNSPECIFIED TYPE: ICD-10-CM

## 2023-11-15 DIAGNOSIS — H00.011 HORDEOLUM EXTERNUM OF RIGHT UPPER EYELID: ICD-10-CM

## 2023-11-15 DIAGNOSIS — Z00.00 MEDICARE ANNUAL WELLNESS VISIT, SUBSEQUENT: ICD-10-CM

## 2023-11-15 LAB
BASOPHILS # BLD: 0.1 K/UL (ref 0–0.2)
BASOPHILS NFR BLD: 1.4 %
DEPRECATED RDW RBC AUTO: 14.2 % (ref 12.4–15.4)
EOSINOPHIL # BLD: 0.1 K/UL (ref 0–0.6)
EOSINOPHIL NFR BLD: 2 %
HCT VFR BLD AUTO: 42.6 % (ref 36–48)
HGB BLD-MCNC: 14.3 G/DL (ref 12–16)
LYMPHOCYTES # BLD: 1.5 K/UL (ref 1–5.1)
LYMPHOCYTES NFR BLD: 35 %
MCH RBC QN AUTO: 29.4 PG (ref 26–34)
MCHC RBC AUTO-ENTMCNC: 33.7 G/DL (ref 31–36)
MCV RBC AUTO: 87.3 FL (ref 80–100)
MONOCYTES # BLD: 0.4 K/UL (ref 0–1.3)
MONOCYTES NFR BLD: 8.8 %
NEUTROPHILS # BLD: 2.2 K/UL (ref 1.7–7.7)
NEUTROPHILS NFR BLD: 52.8 %
PLATELET # BLD AUTO: 222 K/UL (ref 135–450)
PMV BLD AUTO: 9 FL (ref 5–10.5)
RBC # BLD AUTO: 4.88 M/UL (ref 4–5.2)
WBC # BLD AUTO: 4.2 K/UL (ref 4–11)

## 2023-11-15 RX ORDER — POLYMYXIN B SULFATE AND TRIMETHOPRIM 1; 10000 MG/ML; [USP'U]/ML
1 SOLUTION OPHTHALMIC
Qty: 1 EACH | Refills: 0 | Status: SHIPPED | OUTPATIENT
Start: 2023-11-15 | End: 2023-11-25

## 2023-11-15 NOTE — PROGRESS NOTES
50 MCG/ACT nasal spray USE 1 TO 2 SPRAYS IN EACH NOSTRIL DAILY.  NEED TO USE REGULARLY FOR BENEFIT Yes Dino Gonzales MD   Multiple Vitamins-Minerals (THERAPEUTIC MULTIVITAMIN-MINERALS) tablet Take 1 tablet by mouth daily Yes Janet Jurado MD   Calcium Carbonate (CALCIUM-CARB 600 PO) Take 1 tablet by mouth daily Yes ProviderJanet MD   Fexofenadine HCl (ALLERGY 24-HR PO) Take by mouth Yes Janet Jurado MD   GuaiFENesin (MUCINEX PO) Take by mouth Yes Provider, Historical, MD       CareTeam (Including outside providers/suppliers regularly involved in providing care):   Patient Care Team:  Dino Gonzales MD as PCP - General (Family Medicine)  [facility] Gonzales [facility]  Bailey Mcclure MD as Surgeon (General Surgery)     Reviewed and updated this visit:  Tobacco  Allergies  Meds  Med Hx  Surg Hx  Soc Hx  Fam Hx

## 2023-11-15 NOTE — PATIENT INSTRUCTIONS
other eye disorders. A preventive dental visit is recommended every 6 months. Try to get at least 150 minutes of exercise per week or 10,000 steps per day on a pedometer . Order or download the FREE \"Exercise & Physical Activity: Your Everyday Guide\" from [employer]. Call 6-811.285.5882 or search [employer] online. You need 0879-0915 mg of calcium and 0491-4507 IU of vitamin D per day. It is possible to meet your calcium requirement with diet alone, but a vitamin D supplement is usually necessary to meet this goal.  When exposed to the sun, use a sunscreen that protects against both UVA and UVB radiation with an SPF of 30 or greater. Reapply every 2 to 3 hours or after sweating, drying off with a towel, or swimming. Always wear a seat belt when traveling in a car. Always wear a helmet when riding a bicycle or motorcycle.

## 2023-11-16 LAB
25(OH)D3 SERPL-MCNC: 64.5 NG/ML
ALBUMIN SERPL-MCNC: 4.4 G/DL (ref 3.4–5)
ALBUMIN/GLOB SERPL: 2.4 {RATIO} (ref 1.1–2.2)
ALP SERPL-CCNC: 104 U/L (ref 40–129)
ALT SERPL-CCNC: 24 U/L (ref 10–40)
ANION GAP SERPL CALCULATED.3IONS-SCNC: 9 MMOL/L (ref 3–16)
AST SERPL-CCNC: 34 U/L (ref 15–37)
BILIRUB SERPL-MCNC: 0.3 MG/DL (ref 0–1)
BUN SERPL-MCNC: 20 MG/DL (ref 7–20)
CALCIUM SERPL-MCNC: 9.3 MG/DL (ref 8.3–10.6)
CHLORIDE SERPL-SCNC: 104 MMOL/L (ref 99–110)
CO2 SERPL-SCNC: 29 MMOL/L (ref 21–32)
CREAT SERPL-MCNC: 1 MG/DL (ref 0.6–1.2)
GFR SERPLBLD CREATININE-BSD FMLA CKD-EPI: >60 ML/MIN/{1.73_M2}
GLUCOSE SERPL-MCNC: 90 MG/DL (ref 70–99)
POTASSIUM SERPL-SCNC: 4.4 MMOL/L (ref 3.5–5.1)
PROT SERPL-MCNC: 6.2 G/DL (ref 6.4–8.2)
SODIUM SERPL-SCNC: 142 MMOL/L (ref 136–145)

## 2023-12-11 ENCOUNTER — TELEPHONE (OUTPATIENT)
Dept: SURGERY | Age: 68
End: 2023-12-11

## 2023-12-11 NOTE — TELEPHONE ENCOUNTER
Patient lvm says she has some questions regarding her surgery on 7/26 staged MM pt wanted to know about the treatment of the scar and how long does she keep massaging her eye and using the aquaphor.

## 2023-12-11 NOTE — TELEPHONE ENCOUNTER
Called patient at 12:50 p.m. in order to gather more information about her concern. Patient stated she was unsure how long to continue with the scar gel massaging to the R malar infraorbital area, rotation flap repair s/p staged excision with delayed repair. She also said she was concerned about the redness. Made apt. For f/u with Dr. Brenda Barber for scar check.

## 2024-01-24 ENCOUNTER — HOSPITAL ENCOUNTER (OUTPATIENT)
Dept: WOMENS IMAGING | Age: 69
Discharge: HOME | End: 2024-01-24
Attending: FAMILY MEDICINE
Payer: MEDICARE

## 2024-01-24 ENCOUNTER — HOSPITAL ENCOUNTER (OUTPATIENT)
Dept: GENERAL RADIOLOGY | Age: 69
Discharge: HOME | End: 2024-01-24
Attending: FAMILY MEDICINE
Payer: MEDICARE

## 2024-01-24 VITALS — WEIGHT: 116 LBS | HEIGHT: 63 IN | BODY MASS INDEX: 20.55 KG/M2

## 2024-01-24 DIAGNOSIS — M81.6 LOCALIZED OSTEOPOROSIS WITHOUT CURRENT PATHOLOGICAL FRACTURE: ICD-10-CM

## 2024-01-24 DIAGNOSIS — Z12.31 ENCOUNTER FOR SCREENING MAMMOGRAM FOR BREAST CANCER: ICD-10-CM

## 2024-01-24 PROCEDURE — 77063 BREAST TOMOSYNTHESIS BI: CPT

## 2024-01-24 PROCEDURE — 77080 DXA BONE DENSITY AXIAL: CPT

## 2024-01-24 NOTE — RESULT ENCOUNTER NOTE
You have osteoporosis in the spine and osteopenia in the hips.  You did not lose any bone since 2021.  You can continue observation since you did not do well with the medications called bisphosphonates.

## 2024-02-28 ENCOUNTER — OFFICE VISIT (OUTPATIENT)
Dept: SURGERY | Age: 69
End: 2024-02-28

## 2024-02-28 DIAGNOSIS — L90.5 SCAR: Primary | ICD-10-CM

## 2024-02-28 PROCEDURE — 99024 POSTOP FOLLOW-UP VISIT: CPT | Performed by: DERMATOLOGY

## 2024-02-28 NOTE — PROGRESS NOTES
S: The patient is here for scar check s/p mohs on the right malar infraorbital area with Rotation Flap repair, 7 months ago.  The patient feels some tightness with the scar and thinks it's slightly pink to the area.  O: The scar is well-approximated and shows no signs of abnormal healing (expected amount of erythema, fullness and coloration), looks great. Expect continued improvement.   A/P:scar with great cosmesis. Normal eyelid position. No s/sx of recurrence.   Patient questions answered and expectations reviewed.  The patient is scheduled for f/u scar check prn and skin examination with General Dermatology per their recommendations.    Electronically signed by Farheen Brock RN on 2/28/2024 at 3:22 PM    I, Farheen Brock RN, am scribing for and in the presence of Dr.Emily Grigsby,2/28/2024.    IAmelia MD,  personally performed the services described in this documentation as scribed by Farheen Brock RN  in my presence, and it is both accurate and complete.     Electronically signed by Amelia Grigsby MD on 2/28/2024 at 4:13 PM

## 2024-05-10 ENCOUNTER — PATIENT MESSAGE (OUTPATIENT)
Dept: INTERNAL MEDICINE CLINIC | Age: 69
End: 2024-05-10

## 2024-05-10 DIAGNOSIS — J30.2 SEASONAL ALLERGIC RHINITIS, UNSPECIFIED TRIGGER: ICD-10-CM

## 2024-05-10 RX ORDER — MOMETASONE FUROATE 50 UG/1
SPRAY, METERED NASAL
Qty: 51 G | Refills: 3 | Status: SHIPPED | OUTPATIENT
Start: 2024-05-10

## 2024-05-10 NOTE — TELEPHONE ENCOUNTER
From: Shruthi Fields  To: Dr. Aggie Espino  Sent: 5/10/2024 11:23 AM EDT  Subject: Refill     I need a refill on my nasal spray . The prescription has  . Also I tried to request it through the [facility] but it still has express scripts listed. My insurance uses [employer] mail order now. I also called the office earlier in the week about it but not sure if the spray was refilled or not . Thank you Shruthi Fields

## 2024-08-21 ENCOUNTER — TELEPHONE (OUTPATIENT)
Dept: SURGERY | Age: 69
End: 2024-08-21

## 2024-08-21 NOTE — TELEPHONE ENCOUNTER
Pt is ready to come back to see you to flatten the scar; her last visit in Feb disclosed she should wait until closer to end of year to try to schedule a time to have this area tx      Please advise what I am scheduling her for a scar revision or an office visit to evaluate the area.

## 2024-08-22 NOTE — TELEPHONE ENCOUNTER
Spoke with Marley and she scheduled a scar check as advised but said to add a note from her gen kassi Reyes who assessed it at her recent visit 8/21 stating she believes its scar tissue.

## 2024-10-24 ENCOUNTER — OFFICE VISIT (OUTPATIENT)
Dept: SURGERY | Age: 69
End: 2024-10-24
Payer: MEDICARE

## 2024-10-24 DIAGNOSIS — L72.3 SEBACEOUS CYST: ICD-10-CM

## 2024-10-24 DIAGNOSIS — L90.5 SCAR: Primary | ICD-10-CM

## 2024-10-24 PROCEDURE — 1123F ACP DISCUSS/DSCN MKR DOCD: CPT | Performed by: DERMATOLOGY

## 2024-10-24 PROCEDURE — 99213 OFFICE O/P EST LOW 20 MIN: CPT | Performed by: DERMATOLOGY

## 2024-10-24 PROCEDURE — 1159F MED LIST DOCD IN RCRD: CPT | Performed by: DERMATOLOGY

## 2024-10-24 NOTE — PROGRESS NOTES
S: The patient is here for scar check s/p staged excision on the right malar infraorbital area with Rotation Flap repair,  performed on 7/26/2023 The patient complains of an asx bump at the scar line present for almost a year without change.  O: The scar is well-approximated and shows no signs of abnormal healing (expected amount of erythema, fullness and coloration).  Incidentally there is a small mobile subcutaneous and compressible 5mm nodule at the lateral orbital rim  A/P:scar with excellent cosmesis, no functional issues and no s/sx of recurrence.  There is an incidental cystic nodule at the right lateral orbital rim below the scar line.  Ddx - mucous cyst, eic   D/w pt incisional removal for diagnostic and treatment purposes but she declined at this time. Will rtc if lesion grows. It has been stable in size for many months.  Patient questions answered and expectations reviewed.  The patient is scheduled for f/u scar check prn and skin examination with General Dermatology per their recommendations.    Electronically signed by Farheen Brock RN on 10/24/2024 at 3:10 PM    IFarheen RN, am scribing for and in the presence of Dr.Emily Grigsby,10/24/2024  .

## 2024-11-18 ENCOUNTER — PATIENT MESSAGE (OUTPATIENT)
Dept: INTERNAL MEDICINE CLINIC | Age: 69
End: 2024-11-18

## 2024-11-18 DIAGNOSIS — H00.011 HORDEOLUM EXTERNUM OF RIGHT UPPER EYELID: ICD-10-CM

## 2024-11-18 RX ORDER — POLYMYXIN B SULFATE AND TRIMETHOPRIM 1; 10000 MG/ML; [USP'U]/ML
1 SOLUTION OPHTHALMIC
Qty: 1 EACH | Refills: 0 | Status: SHIPPED | OUTPATIENT
Start: 2024-11-18 | End: 2024-11-28

## 2024-11-20 ENCOUNTER — TELEPHONE (OUTPATIENT)
Dept: SURGERY | Age: 69
End: 2024-11-20

## 2024-11-20 NOTE — TELEPHONE ENCOUNTER
Pt states she wants to schedule procedure with Dr Grigsby. (10/24/24 D/w pt incisional removal for diagnostic and treatment purposes but she declined at this time. Will rtc if lesion grows. It has been stable in size for many months.)     Please advise.

## 2024-12-09 SDOH — HEALTH STABILITY: PHYSICAL HEALTH: ON AVERAGE, HOW MANY DAYS PER WEEK DO YOU ENGAGE IN MODERATE TO STRENUOUS EXERCISE (LIKE A BRISK WALK)?: 5 DAYS

## 2024-12-09 SDOH — HEALTH STABILITY: PHYSICAL HEALTH: ON AVERAGE, HOW MANY MINUTES DO YOU ENGAGE IN EXERCISE AT THIS LEVEL?: 120 MIN

## 2024-12-09 ASSESSMENT — LIFESTYLE VARIABLES
HOW MANY STANDARD DRINKS CONTAINING ALCOHOL DO YOU HAVE ON A TYPICAL DAY: 1 OR 2
HOW MANY STANDARD DRINKS CONTAINING ALCOHOL DO YOU HAVE ON A TYPICAL DAY: 1
HOW OFTEN DO YOU HAVE A DRINK CONTAINING ALCOHOL: 2
HOW OFTEN DO YOU HAVE A DRINK CONTAINING ALCOHOL: MONTHLY OR LESS
HOW OFTEN DO YOU HAVE SIX OR MORE DRINKS ON ONE OCCASION: 1

## 2024-12-09 ASSESSMENT — PATIENT HEALTH QUESTIONNAIRE - PHQ9
SUM OF ALL RESPONSES TO PHQ QUESTIONS 1-9: 0
SUM OF ALL RESPONSES TO PHQ QUESTIONS 1-9: 0
2. FEELING DOWN, DEPRESSED OR HOPELESS: NOT AT ALL
SUM OF ALL RESPONSES TO PHQ QUESTIONS 1-9: 0
SUM OF ALL RESPONSES TO PHQ QUESTIONS 1-9: 0
SUM OF ALL RESPONSES TO PHQ9 QUESTIONS 1 & 2: 0
1. LITTLE INTEREST OR PLEASURE IN DOING THINGS: NOT AT ALL

## 2024-12-13 SDOH — ECONOMIC STABILITY: FOOD INSECURITY: WITHIN THE PAST 12 MONTHS, THE FOOD YOU BOUGHT JUST DIDN'T LAST AND YOU DIDN'T HAVE MONEY TO GET MORE.: NEVER TRUE

## 2024-12-13 SDOH — ECONOMIC STABILITY: FOOD INSECURITY: WITHIN THE PAST 12 MONTHS, YOU WORRIED THAT YOUR FOOD WOULD RUN OUT BEFORE YOU GOT MONEY TO BUY MORE.: NEVER TRUE

## 2024-12-13 SDOH — ECONOMIC STABILITY: INCOME INSECURITY: HOW HARD IS IT FOR YOU TO PAY FOR THE VERY BASICS LIKE FOOD, HOUSING, MEDICAL CARE, AND HEATING?: NOT HARD AT ALL

## 2024-12-15 PROBLEM — Z85.820 HISTORY OF MALIGNANT MELANOMA: Status: ACTIVE | Noted: 2023-07-17

## 2024-12-15 PROBLEM — Z85.820 HISTORY OF MALIGNANT MELANOMA: Status: ACTIVE | Noted: 2023-07-24

## 2024-12-15 PROBLEM — C43.30 MALIGNANT MELANOMA OF FACE EXCLUDING EYELID, NOSE, LIP, AND EAR (HCC): Status: RESOLVED | Noted: 2023-06-28 | Resolved: 2024-12-15

## 2024-12-16 ENCOUNTER — OFFICE VISIT (OUTPATIENT)
Dept: INTERNAL MEDICINE CLINIC | Age: 69
End: 2024-12-16

## 2024-12-16 VITALS
BODY MASS INDEX: 20.59 KG/M2 | OXYGEN SATURATION: 100 % | HEIGHT: 63 IN | WEIGHT: 116.2 LBS | HEART RATE: 79 BPM | SYSTOLIC BLOOD PRESSURE: 118 MMHG | DIASTOLIC BLOOD PRESSURE: 72 MMHG

## 2024-12-16 DIAGNOSIS — Z85.820 HISTORY OF MALIGNANT MELANOMA: ICD-10-CM

## 2024-12-16 DIAGNOSIS — M81.6 LOCALIZED OSTEOPOROSIS WITHOUT CURRENT PATHOLOGICAL FRACTURE: ICD-10-CM

## 2024-12-16 DIAGNOSIS — Z51.81 MEDICATION MONITORING ENCOUNTER: ICD-10-CM

## 2024-12-16 DIAGNOSIS — T78.1XXA FOOD SENSITIVITY WITH GASTROINTESTINAL SYMPTOMS: ICD-10-CM

## 2024-12-16 DIAGNOSIS — E78.00 BORDERLINE HYPERCHOLESTEROLEMIA: ICD-10-CM

## 2024-12-16 DIAGNOSIS — Z12.31 ENCOUNTER FOR SCREENING MAMMOGRAM FOR BREAST CANCER: ICD-10-CM

## 2024-12-16 DIAGNOSIS — L21.9 ACUTE SEBORRHEIC DERMATITIS: ICD-10-CM

## 2024-12-16 DIAGNOSIS — Z00.00 MEDICARE ANNUAL WELLNESS VISIT, SUBSEQUENT: Primary | ICD-10-CM

## 2024-12-16 LAB
25(OH)D3 SERPL-MCNC: 84.4 NG/ML
ALBUMIN SERPL-MCNC: 4.1 G/DL (ref 3.4–5)
ALBUMIN/GLOB SERPL: 1.6 {RATIO} (ref 1.1–2.2)
ALP SERPL-CCNC: 142 U/L (ref 40–129)
ALT SERPL-CCNC: 58 U/L (ref 10–40)
ANION GAP SERPL CALCULATED.3IONS-SCNC: 16 MMOL/L (ref 3–16)
AST SERPL-CCNC: 63 U/L (ref 15–37)
BASOPHILS # BLD: 0.1 K/UL (ref 0–0.2)
BASOPHILS NFR BLD: 1 %
BILIRUB SERPL-MCNC: 0.3 MG/DL (ref 0–1)
BUN SERPL-MCNC: 15 MG/DL (ref 7–20)
CALCIUM SERPL-MCNC: 9.6 MG/DL (ref 8.3–10.6)
CHLORIDE SERPL-SCNC: 99 MMOL/L (ref 99–110)
CHOLEST SERPL-MCNC: 208 MG/DL (ref 0–199)
CO2 SERPL-SCNC: 27 MMOL/L (ref 21–32)
CREAT SERPL-MCNC: 0.6 MG/DL (ref 0.6–1.2)
DEPRECATED RDW RBC AUTO: 14.5 % (ref 12.4–15.4)
EOSINOPHIL # BLD: 0.1 K/UL (ref 0–0.6)
EOSINOPHIL NFR BLD: 1.7 %
GFR SERPLBLD CREATININE-BSD FMLA CKD-EPI: >90 ML/MIN/{1.73_M2}
GLUCOSE SERPL-MCNC: 85 MG/DL (ref 70–99)
HCT VFR BLD AUTO: 44.9 % (ref 36–48)
HDLC SERPL-MCNC: 81 MG/DL (ref 40–60)
HGB BLD-MCNC: 15.1 G/DL (ref 12–16)
LDLC SERPL CALC-MCNC: 105 MG/DL
LYMPHOCYTES # BLD: 1.3 K/UL (ref 1–5.1)
LYMPHOCYTES NFR BLD: 21.4 %
MCH RBC QN AUTO: 29.3 PG (ref 26–34)
MCHC RBC AUTO-ENTMCNC: 33.6 G/DL (ref 31–36)
MCV RBC AUTO: 87.4 FL (ref 80–100)
MONOCYTES # BLD: 0.6 K/UL (ref 0–1.3)
MONOCYTES NFR BLD: 9.4 %
NEUTROPHILS # BLD: 4 K/UL (ref 1.7–7.7)
NEUTROPHILS NFR BLD: 66.5 %
PLATELET # BLD AUTO: 246 K/UL (ref 135–450)
PMV BLD AUTO: 9.1 FL (ref 5–10.5)
POTASSIUM SERPL-SCNC: 3.9 MMOL/L (ref 3.5–5.1)
PROT SERPL-MCNC: 6.6 G/DL (ref 6.4–8.2)
RBC # BLD AUTO: 5.13 M/UL (ref 4–5.2)
SODIUM SERPL-SCNC: 142 MMOL/L (ref 136–145)
TRIGL SERPL-MCNC: 109 MG/DL (ref 0–150)
TSH SERPL DL<=0.005 MIU/L-ACNC: 3.5 UIU/ML (ref 0.27–4.2)
VLDLC SERPL CALC-MCNC: 22 MG/DL
WBC # BLD AUTO: 5.9 K/UL (ref 4–11)

## 2024-12-16 SDOH — ECONOMIC STABILITY: FOOD INSECURITY: WITHIN THE PAST 12 MONTHS, THE FOOD YOU BOUGHT JUST DIDN'T LAST AND YOU DIDN'T HAVE MONEY TO GET MORE.: NEVER TRUE

## 2024-12-16 SDOH — ECONOMIC STABILITY: FOOD INSECURITY: WITHIN THE PAST 12 MONTHS, YOU WORRIED THAT YOUR FOOD WOULD RUN OUT BEFORE YOU GOT MONEY TO BUY MORE.: NEVER TRUE

## 2024-12-16 SDOH — ECONOMIC STABILITY: INCOME INSECURITY: HOW HARD IS IT FOR YOU TO PAY FOR THE VERY BASICS LIKE FOOD, HOUSING, MEDICAL CARE, AND HEATING?: NOT HARD AT ALL

## 2024-12-16 NOTE — PROGRESS NOTES
Medicare Annual Wellness Visit    Shruthi Fields is here for Medicare AWV      Assessment & Plan   1. Medicare annual wellness visit, subsequent    2. Encounter for screening mammogram for breast cancer  - [facility] JEANNINE DIGITAL SCREEN BILATERAL; Future    3. Osteoporosis  Not currently on any treatment.  She had a lot of GI intolerances with bisphosphonates in the risedronate cause some liver enzyme elevations, or at least that is what her GI specialist thought.  Spinal T-score is still -3.1.  No significant change compared to prior scan 2+ years earlier.  Hips of both osteopenic.  She did take risedronate for several months before stopping.  May 2022 to December 2022 or approx.  - TSH with Reflex; Future  - Vitamin D 25 Hydroxy; Future    4. Borderline hypercholesterolemia  The 10-year ASCVD risk score (Remedios HONG, et al., 2019) is: 6.7%    Values used to calculate the score:      Age: 68 years      Sex: Female      Is Non- : No      Diabetic: No      Tobacco smoker: No      Systolic Blood Pressure: 118 mmHg      Is BP treated: No      HDL Cholesterol: 83 mg/dL      Total Cholesterol: 209 mg/dL    Non-HDL cholesterol is 120.  Since there is not a strong family history of CAD until very elderly years, will hold on treatment until ASCVD is 10% or non-HDL is worse.    - Lipid Panel; Future    5. Medication monitoring encounter  - CBC with Auto Differential; Future  - Comprehensive Metabolic Panel; Future    6. History of malignant melanoma  cheek  Status post removal by Amelia Grigsby, dermatologist.  - CBC with Auto Differential; Future  - Comprehensive Metabolic Panel; Future    7. Food sensitivity with gastrointestinal symptoms  She feels better avoiding the food she was told she was allergic to.  As long as she is getting nourished, this is fine to avoid those foods.    8. Acute seborrheic dermatitis  Currently I see nothing on her eyebrows or scalp.  It is possible that this was brought on by

## 2024-12-17 ENCOUNTER — PATIENT MESSAGE (OUTPATIENT)
Dept: INTERNAL MEDICINE CLINIC | Age: 69
End: 2024-12-17

## 2024-12-17 DIAGNOSIS — R74.01 TRANSAMINITIS: Primary | ICD-10-CM

## 2024-12-17 DIAGNOSIS — E78.00 HYPERCHOLESTEROLEMIA: ICD-10-CM

## 2024-12-17 NOTE — RESULT ENCOUNTER NOTE
Labs are normal except liver enzymes are back up.  Therefore, it is unlikely the risedronate pill that caused it last time.    Your cholesterol is borderline high.  Something called the non-HDL is acceptable but could be better.  Due to your family history on your Dad's side, you may want to consider cholesterol medication.  Lowering the cholesterol may help the elevated liver tests.  Let me know if you want to give it a try.

## 2025-01-15 ENCOUNTER — PROCEDURE VISIT (OUTPATIENT)
Dept: SURGERY | Age: 70
End: 2025-01-15
Payer: MEDICARE

## 2025-01-15 DIAGNOSIS — D48.5 NEOPLASM OF UNCERTAIN BEHAVIOR OF SKIN: Primary | ICD-10-CM

## 2025-01-15 PROCEDURE — 12051 INTMD RPR FACE/MM 2.5 CM/<: CPT | Performed by: DERMATOLOGY

## 2025-01-15 PROCEDURE — 11401 EXC TR-EXT B9+MARG 0.6-1 CM: CPT | Performed by: DERMATOLOGY

## 2025-01-15 NOTE — PROGRESS NOTES
EXCISION OPERATIVE PROCEDURE NOTE    SURGEON: Amelia Grigsby MD    ASSISTANT:  Terrence Mcneil RN     REFERRING PROVIDER:  Dr. Amelia Grigsby    PREOPERATIVE DIAGNOSIS: Neoplasm of uncertain behaviour    POSTOPERATIVE DIAGNOSIS: Suture granuloma.     OPERATIVE PROCEDURE: EXCISION FOR CURATIVE INTENT    RECONSTRUCTION OF DEFECT: Intermediate layered closure    LOCATION: Right lateral orbital rim     SIZE OF LESION: 6x4 MM     SIZE OF LESION PLUS CIRCUMFERENTIAL MARGIN: same     FINAL REPAIR LENGTH:  12 MM    ANESTHESIA: 1 CC XYLOCAINE 1% WITH EPINEPHRINE 1:100,000, BUFFERED.      DURATION OF PROCEDURE: 10 MINUTES     POSTOPERATIVE OBSERVATION: 30 MINUTES     EBL: MINIMAL.     SPECIMENS: One    COMPLICATIONS: NONE     DESCRIPTION OF PROCEDURE: The patient was given a mirror and the lesin site was identified, marked with surgical marking pen, and verified with the patient. Written consent was obtained. There was a time out for person and procedure verification. The operative site was cleansed with Betadine, then cleaned off, dried, and draped sterilely. The lesion was excised via a linear incision design down to subcutaneous fat. The firm area was palpated and bluntly dissected around with curved gradle scissors.  This was then sharply excised and removed and sent for pathology.. Hemostasis was achieved with electrosurgery.     DEFECT MANAGEMENT:  Various closure modalities were discussed with the patient, and it was decided that an Intermediate layered closure would best preserve normal anatomic and functional relationships. Additional risk of wound dehiscence was discussed.     The final incision lines were placed with respect for the patient's natural skin tension lines in a linear configuration to avoid functional and aesthetic distortion of adjacent free margins. Following minimal undermining, meticulous hemostasis was obtained with spot monopolar electrocoagulation.  Subcutaneous dead space and dermis were closed

## 2025-01-16 ENCOUNTER — TELEPHONE (OUTPATIENT)
Dept: SURGERY | Age: 70
End: 2025-01-16

## 2025-01-16 NOTE — TELEPHONE ENCOUNTER
The patient was in the office on 1/15/24 for excision located on the right lateral orbital rim with ILC repair.  The patient tolerated the procedure well and left the office in good condition.    Pain level on post-operative day 1:  none     Any bleeding episode that required pressure to be held, bandage change or a call to the office or MD?  no     Any other issues?:  no    A post-operative telephone call was placed at [phone] in order to check on the patient's recovery process.  The patient reported doing well and had no complaints other than those listed above, if any.  All of the patient's questions were answered.

## 2025-01-23 ENCOUNTER — TELEPHONE (OUTPATIENT)
Dept: SURGERY | Age: 70
End: 2025-01-23

## 2025-01-23 LAB — DERMATOLOGY PATHOLOGY REPORT: NORMAL

## 2025-01-23 NOTE — TELEPHONE ENCOUNTER
Called & spoke to PT to review results of the biopsy.    Date of biopsy: 01/15/2025    Site of biopsy: Scar    Result: Benign lesion- foreign body reaction to suture    Plan: No further treatment necessary.     The patient expressed understanding of the plan.  PT advised swelling has gone down some.   Advised to call w/any questions/concerns.

## 2025-01-24 ENCOUNTER — HOSPITAL ENCOUNTER (OUTPATIENT)
Dept: WOMENS IMAGING | Age: 70
Discharge: HOME | End: 2025-01-24
Payer: MEDICARE

## 2025-01-24 ENCOUNTER — HOSPITAL ENCOUNTER (OUTPATIENT)
Age: 70
Discharge: HOME | End: 2025-01-24
Payer: MEDICARE

## 2025-01-24 VITALS — BODY MASS INDEX: 20.38 KG/M2 | HEIGHT: 63 IN | WEIGHT: 115 LBS

## 2025-01-24 DIAGNOSIS — R74.01 TRANSAMINITIS: ICD-10-CM

## 2025-01-24 DIAGNOSIS — E78.00 HYPERCHOLESTEROLEMIA: ICD-10-CM

## 2025-01-24 DIAGNOSIS — Z12.31 ENCOUNTER FOR SCREENING MAMMOGRAM FOR BREAST CANCER: ICD-10-CM

## 2025-01-24 LAB
ALBUMIN SERPL-MCNC: 4.2 G/DL (ref 3.4–5)
ALP SERPL-CCNC: 93 U/L (ref 40–129)
ALT SERPL-CCNC: 31 U/L (ref 10–40)
AST SERPL-CCNC: 36 U/L (ref 15–37)
BILIRUB DIRECT SERPL-MCNC: 0.2 MG/DL (ref 0–0.3)
BILIRUB INDIRECT SERPL-MCNC: 0.1 MG/DL (ref 0–1)
BILIRUB SERPL-MCNC: 0.3 MG/DL (ref 0–1)
CRP SERPL HS-MCNC: <0.15 MG/L (ref 0.16–3)
PROT SERPL-MCNC: 6.5 G/DL (ref 6.4–8.2)

## 2025-01-24 PROCEDURE — 80076 HEPATIC FUNCTION PANEL: CPT

## 2025-01-24 PROCEDURE — 77067 SCR MAMMO BI INCL CAD: CPT

## 2025-01-24 PROCEDURE — 86141 C-REACTIVE PROTEIN HS: CPT

## 2025-01-24 PROCEDURE — 36415 COLL VENOUS BLD VENIPUNCTURE: CPT

## 2025-01-24 PROCEDURE — 83695 ASSAY OF LIPOPROTEIN(A): CPT

## 2025-01-26 LAB — LPA SERPL-MCNC: <6 MG/DL

## 2025-06-27 DIAGNOSIS — J30.2 SEASONAL ALLERGIC RHINITIS, UNSPECIFIED TRIGGER: ICD-10-CM

## 2025-06-27 RX ORDER — MOMETASONE FUROATE MONOHYDRATE 50 UG/1
SPRAY, METERED NASAL
Qty: 51 G | Refills: 3 | Status: SHIPPED | OUTPATIENT
Start: 2025-06-27

## 2025-06-27 NOTE — TELEPHONE ENCOUNTER
Medication:   Requested Prescriptions     Pending Prescriptions Disp Refills    mometasone (NASONEX) 50 MCG/ACT nasal spray [Pharmacy Med Name: MOMETASONE FUROATE 50 MCG SPRY]  3     Sig: USE 1 TO 2 SPRAYS IN EACH NOSTRIL DAILY. NEED TO USE REGULARLY FOR BENEFIT        Last Filled:      Patient Phone Number: [phone] (home)     Last appt: 12/16/2024   Next appt: 12/17/2025    Last OARRS:        No data to display

## (undated) DEVICE — SUTURE VCRL + SZ 0 L27IN ABSRB VLT L26MM UR-6 5/8 CIR VCP603H

## (undated) DEVICE — SEALER LAP L37CM MARYLAND JAW OPN NANO COAT MULTIFUNCTIONAL

## (undated) DEVICE — GOWN SIRUS NONREIN LG W/TWL: Brand: MEDLINE INDUSTRIES, INC.

## (undated) DEVICE — TROCAR SLEEVE: Brand: KII ® LOW PROFILE SLEEVE

## (undated) DEVICE — STAPLER INT L34CM 60MM LNG ENDOSCP ARTC PWR + ECHELON FLX

## (undated) DEVICE — TROCAR: Brand: KII FIOS FIRST ENTRY

## (undated) DEVICE — ADHESIVE SKIN CLSR 0.7ML TOP DERMBND ADV

## (undated) DEVICE — BAG RETRIEVAL SPECIMEN SUPERBAG 10 MEDIUM NYLON ITRODUCER

## (undated) DEVICE — NEEDLE HYPO 22GA L1.5IN BLK POLYPR HUB S STL REG BVL STR

## (undated) DEVICE — LAPAROSCOPIC TROCAR SLEEVE/SINGLE USE: Brand: KII® LOW PROFILE OPTICAL ACCESS SYSTEM

## (undated) DEVICE — SUTURE MCRYL + SZ 4-0 L27IN ABSRB UD L19MM PS-2 3/8 CIR MCP426H

## (undated) DEVICE — GLOVE SURG SZ 6.5 L11.2IN FNGR THK9.8MIL STRW LTX POLYMER

## (undated) DEVICE — SYRINGE MED 10ML TRNSLUC BRL PLUNG BLK MRK POLYPR CTRL

## (undated) DEVICE — Z DISCONTINUED USE 2272117 DRAPE SURG 3 QTR N INVASIVE 2 LAYR DISP

## (undated) DEVICE — COVER LT HNDL BLU PLAS

## (undated) DEVICE — LAPAROSCOPY PACK: Brand: MEDLINE INDUSTRIES, INC.

## (undated) DEVICE — SOLUTION IRRIG 1000ML 0.9% SOD CHL USP POUR PLAS BTL

## (undated) DEVICE — MERCY FAIRFIELD TURNOVER KIT: Brand: MEDLINE INDUSTRIES, INC.

## (undated) DEVICE — DRAPE,LAP,CHOLE,W/TROUGHS,STERILE: Brand: MEDLINE

## (undated) DEVICE — RELOAD STPL L60MM H1.5-3.6MM REG TISS BLU GRIPPING SURF B

## (undated) DEVICE — APPLICATOR MEDICATED 26 CC SOLUTION HI LT ORNG CHLORAPREP

## (undated) DEVICE — GOWN SIRUS NONREIN XL W/TWL: Brand: MEDLINE INDUSTRIES, INC.

## (undated) DEVICE — 3M™ IOBAN™ 2 ANTIMICROBIAL INCISE DRAPE 6650EZ: Brand: IOBAN™ 2